# Patient Record
Sex: FEMALE | Race: OTHER | HISPANIC OR LATINO | Employment: UNEMPLOYED | URBAN - METROPOLITAN AREA
[De-identification: names, ages, dates, MRNs, and addresses within clinical notes are randomized per-mention and may not be internally consistent; named-entity substitution may affect disease eponyms.]

---

## 2022-03-21 ENCOUNTER — TELEPHONE (OUTPATIENT)
Dept: OBSTETRICS AND GYNECOLOGY | Facility: CLINIC | Age: 22
End: 2022-03-21

## 2022-04-04 ENCOUNTER — HOSPITAL ENCOUNTER (EMERGENCY)
Facility: HOSPITAL | Age: 22
Discharge: HOME | End: 2022-04-04
Attending: EMERGENCY MEDICINE
Payer: MEDICAID

## 2022-04-04 VITALS
SYSTOLIC BLOOD PRESSURE: 138 MMHG | TEMPERATURE: 98 F | BODY MASS INDEX: 22.15 KG/M2 | RESPIRATION RATE: 20 BRPM | HEART RATE: 99 BPM | WEIGHT: 125 LBS | OXYGEN SATURATION: 100 % | DIASTOLIC BLOOD PRESSURE: 82 MMHG | HEIGHT: 63 IN

## 2022-04-04 DIAGNOSIS — R31.9 URINARY TRACT INFECTION WITH HEMATURIA, SITE UNSPECIFIED: Primary | ICD-10-CM

## 2022-04-04 DIAGNOSIS — O20.0 THREATENED MISCARRIAGE IN EARLY PREGNANCY: ICD-10-CM

## 2022-04-04 DIAGNOSIS — N39.0 URINARY TRACT INFECTION WITH HEMATURIA, SITE UNSPECIFIED: Primary | ICD-10-CM

## 2022-04-04 DIAGNOSIS — E87.6 HYPOKALEMIA: ICD-10-CM

## 2022-04-04 DIAGNOSIS — R01.1 MURMUR: ICD-10-CM

## 2022-04-04 DIAGNOSIS — R01.1 SYSTOLIC MURMUR: ICD-10-CM

## 2022-04-04 LAB
ABO + RH BLD: NORMAL
ALBUMIN SERPL BCP-MCNC: 4.1 G/DL (ref 3.5–5.2)
ALP SERPL-CCNC: 41 U/L (ref 55–135)
ALT SERPL W/O P-5'-P-CCNC: 12 U/L (ref 10–44)
ANION GAP SERPL CALC-SCNC: 11 MMOL/L (ref 8–16)
AST SERPL-CCNC: 16 U/L (ref 10–40)
B-HCG UR QL: POSITIVE
BACTERIA #/AREA URNS HPF: NEGATIVE /HPF
BASOPHILS # BLD AUTO: 0.02 K/UL (ref 0–0.2)
BASOPHILS NFR BLD: 0.3 % (ref 0–1.9)
BILIRUB SERPL-MCNC: 0.4 MG/DL (ref 0.1–1)
BILIRUB UR QL STRIP: NEGATIVE
BUN SERPL-MCNC: 5 MG/DL (ref 6–20)
CALCIUM SERPL-MCNC: 9.4 MG/DL (ref 8.7–10.5)
CHLORIDE SERPL-SCNC: 100 MMOL/L (ref 95–110)
CLARITY UR: ABNORMAL
CO2 SERPL-SCNC: 24 MMOL/L (ref 23–29)
COLOR UR: YELLOW
CREAT SERPL-MCNC: 0.4 MG/DL (ref 0.5–1.4)
CTP QC/QA: YES
DIFFERENTIAL METHOD: ABNORMAL
EOSINOPHIL # BLD AUTO: 0.1 K/UL (ref 0–0.5)
EOSINOPHIL NFR BLD: 0.7 % (ref 0–8)
ERYTHROCYTE [DISTWIDTH] IN BLOOD BY AUTOMATED COUNT: 11.9 % (ref 11.5–14.5)
EST. GFR  (AFRICAN AMERICAN): >60 ML/MIN/1.73 M^2
EST. GFR  (NON AFRICAN AMERICAN): >60 ML/MIN/1.73 M^2
GLUCOSE SERPL-MCNC: 91 MG/DL (ref 70–110)
GLUCOSE UR QL STRIP: NEGATIVE
HCG INTACT+B SERPL-ACNC: NORMAL MIU/ML
HCT VFR BLD AUTO: 40.7 % (ref 37–48.5)
HGB BLD-MCNC: 14.3 G/DL (ref 12–16)
HGB UR QL STRIP: NEGATIVE
HYALINE CASTS #/AREA URNS LPF: 8 /LPF
IMM GRANULOCYTES # BLD AUTO: 0.02 K/UL (ref 0–0.04)
IMM GRANULOCYTES NFR BLD AUTO: 0.3 % (ref 0–0.5)
KETONES UR QL STRIP: NEGATIVE
LEUKOCYTE ESTERASE UR QL STRIP: ABNORMAL
LYMPHOCYTES # BLD AUTO: 1.6 K/UL (ref 1–4.8)
LYMPHOCYTES NFR BLD: 20.7 % (ref 18–48)
MCH RBC QN AUTO: 31.2 PG (ref 27–31)
MCHC RBC AUTO-ENTMCNC: 35.1 G/DL (ref 32–36)
MCV RBC AUTO: 89 FL (ref 82–98)
MICROSCOPIC COMMENT: ABNORMAL
MONOCYTES # BLD AUTO: 0.5 K/UL (ref 0.3–1)
MONOCYTES NFR BLD: 6.9 % (ref 4–15)
NEUTROPHILS # BLD AUTO: 5.3 K/UL (ref 1.8–7.7)
NEUTROPHILS NFR BLD: 71.1 % (ref 38–73)
NITRITE UR QL STRIP: NEGATIVE
NRBC BLD-RTO: 0 /100 WBC
PH UR STRIP: 8 [PH] (ref 5–8)
PLATELET # BLD AUTO: 194 K/UL (ref 150–450)
PMV BLD AUTO: 10.4 FL (ref 9.2–12.9)
POTASSIUM SERPL-SCNC: 3.3 MMOL/L (ref 3.5–5.1)
PROT SERPL-MCNC: 7.5 G/DL (ref 6–8.4)
PROT UR QL STRIP: NEGATIVE
RBC # BLD AUTO: 4.59 M/UL (ref 4–5.4)
RBC #/AREA URNS HPF: 4 /HPF (ref 0–4)
SODIUM SERPL-SCNC: 135 MMOL/L (ref 136–145)
SP GR UR STRIP: 1 (ref 1–1.03)
SPECIMEN SOURCE: NORMAL
SQUAMOUS #/AREA URNS HPF: 6 /HPF
T VAGINALIS GENITAL QL WET PREP: NORMAL
URN SPEC COLLECT METH UR: ABNORMAL
UROBILINOGEN UR STRIP-ACNC: NEGATIVE EU/DL
WBC # BLD AUTO: 7.5 K/UL (ref 3.9–12.7)
WBC #/AREA URNS HPF: 6 /HPF (ref 0–5)
YEAST GENITAL QL WET PREP: NORMAL

## 2022-04-04 PROCEDURE — 99284 EMERGENCY DEPT VISIT MOD MDM: CPT | Mod: 25

## 2022-04-04 PROCEDURE — 25000003 PHARM REV CODE 250: Performed by: NURSE PRACTITIONER

## 2022-04-04 PROCEDURE — 87210 SMEAR WET MOUNT SALINE/INK: CPT | Performed by: NURSE PRACTITIONER

## 2022-04-04 PROCEDURE — 81001 URINALYSIS AUTO W/SCOPE: CPT | Performed by: NURSE PRACTITIONER

## 2022-04-04 PROCEDURE — 85025 COMPLETE CBC W/AUTO DIFF WBC: CPT | Performed by: NURSE PRACTITIONER

## 2022-04-04 PROCEDURE — 86901 BLOOD TYPING SEROLOGIC RH(D): CPT | Performed by: NURSE PRACTITIONER

## 2022-04-04 PROCEDURE — 84702 CHORIONIC GONADOTROPIN TEST: CPT | Performed by: NURSE PRACTITIONER

## 2022-04-04 PROCEDURE — 87491 CHLMYD TRACH DNA AMP PROBE: CPT | Performed by: NURSE PRACTITIONER

## 2022-04-04 PROCEDURE — 81025 URINE PREGNANCY TEST: CPT | Performed by: NURSE PRACTITIONER

## 2022-04-04 PROCEDURE — 80053 COMPREHEN METABOLIC PANEL: CPT | Performed by: NURSE PRACTITIONER

## 2022-04-04 PROCEDURE — 87591 N.GONORRHOEAE DNA AMP PROB: CPT | Performed by: NURSE PRACTITIONER

## 2022-04-04 RX ORDER — CEFUROXIME AXETIL 250 MG/1
250 TABLET ORAL EVERY 12 HOURS
Qty: 10 TABLET | Refills: 0 | Status: SHIPPED | OUTPATIENT
Start: 2022-04-04 | End: 2022-04-09

## 2022-04-04 RX ORDER — POTASSIUM CHLORIDE 20 MEQ/1
20 TABLET, EXTENDED RELEASE ORAL ONCE
Status: COMPLETED | OUTPATIENT
Start: 2022-04-04 | End: 2022-04-04

## 2022-04-04 RX ADMIN — POTASSIUM CHLORIDE 20 MEQ: 20 TABLET, EXTENDED RELEASE ORAL at 12:04

## 2022-04-04 NOTE — ED PROVIDER NOTES
Encounter Date: 4/4/2022       History     Chief Complaint   Patient presents with    PREGNANCY PROBLEM     APPROX 11 WEEKS PREG    Vaginal Bleeding     ONSET Friday, NONE TODAY OR YESTERDAY    Abdominal Cramping     Jerri Ramirez is a 22 year old female with no pmh presenting to the ED with c/o vaginal bleeding that occurred 2-3 days ago but has not happened again since that time. She states she only noticed blood when wiping after urinating. She also has had lower abdominal cramping with no vaginal discharge, dysuria, frequency, urgency, hematuria, fever. She is approximately 11 weeks pregnant and has not been seen by OB. She has no prior pregnancy history.     The history is limited by a language barrier. A  was used.     Review of patient's allergies indicates:  No Known Allergies  History reviewed. No pertinent past medical history.  No past surgical history on file.  No family history on file.     Review of Systems   Constitutional: Negative for fever.   HENT: Negative for sore throat.    Respiratory: Negative for shortness of breath.    Cardiovascular: Negative for chest pain.   Gastrointestinal: Positive for abdominal pain. Negative for diarrhea, nausea and vomiting.   Genitourinary: Positive for vaginal bleeding. Negative for dysuria and vaginal discharge.   Musculoskeletal: Negative for back pain.   Skin: Negative for rash.   Neurological: Negative for weakness.   Hematological: Does not bruise/bleed easily.       Physical Exam     Initial Vitals [04/04/22 0825]   BP Pulse Resp Temp SpO2   (!) 139/93 100 20 98.3 °F (36.8 °C) 100 %      MAP       --         Physical Exam    Nursing note and vitals reviewed.  Constitutional: She appears well-developed and well-nourished. She is not diaphoretic. No distress.   HENT:   Head: Normocephalic and atraumatic.   Mouth/Throat: Oropharynx is clear and moist.   Eyes: Conjunctivae are normal.   Neck: Neck supple.   Cardiovascular: Normal  rate, regular rhythm and intact distal pulses. Exam reveals no gallop and no friction rub.    Murmur heard.   Systolic murmur is present with a grade of 1/6.  Pulmonary/Chest: Breath sounds normal. No respiratory distress. She has no wheezes. She has no rhonchi. She has no rales.   Abdominal: Abdomen is soft. She exhibits no distension. There is abdominal tenderness (mild suprapubic).   Genitourinary: Uterus is not tender. Cervix exhibits no motion tenderness and no discharge. Right adnexum displays no tenderness. Left adnexum displays no tenderness.    No vaginal discharge or bleeding.   No bleeding in the vagina.   Musculoskeletal:         General: Normal range of motion.      Cervical back: Neck supple.     Neurological: She is alert and oriented to person, place, and time.   Skin: No rash noted. No erythema.   Psychiatric: Her speech is normal.         ED Course   Procedures  Labs Reviewed   CBC W/ AUTO DIFFERENTIAL - Abnormal; Notable for the following components:       Result Value    MCH 31.2 (*)     All other components within normal limits    Narrative:     Release to patient->Immediate   COMPREHENSIVE METABOLIC PANEL - Abnormal; Notable for the following components:    Sodium 135 (*)     Potassium 3.3 (*)     BUN 5 (*)     Creatinine 0.4 (*)     Alkaline Phosphatase 41 (*)     All other components within normal limits    Narrative:     Release to patient->Immediate   URINALYSIS, REFLEX TO URINE CULTURE - Abnormal; Notable for the following components:    Appearance, UA Hazy (*)     Leukocytes, UA 1+ (*)     All other components within normal limits    Narrative:     Specimen Source->Urine   URINALYSIS MICROSCOPIC - Abnormal; Notable for the following components:    WBC, UA 6 (*)     Hyaline Casts, UA 8 (*)     All other components within normal limits    Narrative:     Specimen Source->Urine   POCT URINE PREGNANCY - Abnormal; Notable for the following components:    POC Preg Test, Ur Positive (*)     All  other components within normal limits   VAGINAL SCREEN   C. TRACHOMATIS/N. GONORRHOEAE BY AMP DNA   HCG, QUANTITATIVE    Narrative:     Release to patient->Immediate   GROUP & RH          Imaging Results          US OB <14 Wks, TransAbd, Single Gestation (Final result)  Result time 04/04/22 10:12:53   Procedure changed from US OB <14 Wks TransAbd & TransVag, Single Gestation (XPD)     Final result by Alban Mann MD (04/04/22 10:12:53)                 Narrative:    HISTORY: Pelvic pain,  vaginal bleeding, first trimester pregnancy.    FINDINGS: Transabdominal obstetric ultrasound was performed, with no prior studies for comparison. The uterus is anteverted, and contains a single intrauterine gestational sac with mean sac diameter of 5.35 cm. The gestational sac contains a single live fetus with crown-rump length of 3.04 cm, and fetal heart rate of 167 bpm. There are no abnormal perigestational fluid collections.    Both ovaries are normal in size and echotexture, and have normal color and spectral Doppler vascular flow. The right ovary measures 4.3 x 2.3 x 1.9 cm, with the left ovary measuring 3 x 1.9 x 1.6 cm. There are no adnexal masses or pelvic free fluid demonstrated.    IMPRESSION: Single live intrauterine pregnancy with estimated gestational age of 10 weeks 5 days, and estimated date of delivery of 10/26/2022.    Electronically signed by:  Alban Mann MD  4/4/2022 10:12 AM CDT Workstation: [id number]                               Medications   potassium chloride SA CR tablet 20 mEq (20 mEq Oral Given 4/4/22 1209)           APC / Resident Notes:   The patient has a confirmed IUP on ultrasound with no vaginal bleeding or cervical discharge on pelvic exam. She has no CMT. Fetus measuring 10 weeks 5 days which is consistent with patient's dates of LMP. Discussed possibility of threatened miscarriage with the patient using  service and advised her to refrain from intercourse or strenuous activity  until cleared by OB. Instructed to return in 48-72 hours for repeat HCG if she is unable to be seen by GYN during that time.   She does have some blood, WBC, and leukocytes in the urine and likely has a UTI. No vaginal bleeding noted on exam and she states she only noticed blood with wiping after urinating. Will treat with ceftin and send culture.  Mildly hypokalemic and this was replaced orally in the ED.   She is also found to have a systolic murmur with no prior known history. EKG unremarkable and she denies any chest pain/SOB. Will refer to cardiology for further evaluation. Advised patient of importance of close follow up with cardiology and OBGYN.  Strict ED return precautions discussed and she verbalized understanding. Based on my clinical evaluation, I do not appreciate any immediate, emergent, or life threatening condition or etiology that warrants additional workup today and feel that the patient can be discharged with close follow up care.          Attending Attestation:             Attending ED Notes:   I was available for consult               Clinical Impression:   Final diagnoses:  [R01.1] Murmur  [N39.0, R31.9] Urinary tract infection with hematuria, site unspecified (Primary)  [O20.0] Threatened miscarriage in early pregnancy  [R01.1] Systolic murmur  [E87.6] Hypokalemia          ED Disposition Condition    Discharge Stable        ED Prescriptions     Medication Sig Dispense Start Date End Date Auth. Provider    cefUROXime (CEFTIN) 250 MG tablet Take 1 tablet (250 mg total) by mouth every 12 (twelve) hours. for 5 days 10 tablet 4/4/2022 4/9/2022 Dariela Che NP        Follow-up Information     Follow up With Specialties Details Why Contact Info Additional Information    Count includes the [facility] - Emergency Dept Emergency Medicine  As needed, If symptoms worsen [address] [facility] [id number] [phone] 1st floor    Irene Roque MD Obstetrics, Obstetrics and Gynecology  Schedule an appointment as soon as possible for a visit in 2 days  [address] [facility] [facility]  [address]  [facility] OBSTETRICS & GYNECOLOGY  [facility] [address]  [phone]              Zia Armenta MD  04/05/22 1733       Dariela Che NP  04/05/22 1757

## 2022-04-04 NOTE — ED NOTES
LOC: The patient is awake, alert and aware of environment with an appropriate affect, the patient is oriented x 3 and speaking appropriately.  APPEARANCE: Patient resting comfortably and in no acute distress, patient is clean and well groomed, patient's clothing properly fastened.  SKIN: The skin is warm and dry, color consistent with ethnicity, patient has normal skin turgor and moist mucus membranes, skin intact, no breakdown or brusing noted.  MUSKULOSKELETAL: Patient moving all extremities well, no obvious swelling or deformities noted.  RESPIRATORY: Airway is open and patent, respirations are spontaneous, patient has a normal effort and rate, no accessory muscle use noted.  CARDIAC: Patient has a normal rate and rhythm, no peripheral edema noted, capillary refill < 3 seconds.  ABDOMEN: Soft and non tender to palpation, no distention noted.  NEUROLOGIC: PERRL, 3mm bilaterally, eyes open spontaneously, behavior appropriate to situation, follows commands, facial expression symmetrical, bilateral hand grasp equal and even, purposeful motor response noted, normal sensation in all extremities when touched with a finger.    Patient reports vaginal bleeding 2 days ago, none in last 2 days, reports being approximately 11 weeks pregnant, denies being under care of OB at this time.

## 2022-04-06 LAB
CHLAMYDIA, AMPLIFIED DNA: NEGATIVE
N GONORRHOEAE, AMPLIFIED DNA: NEGATIVE

## 2022-04-07 ENCOUNTER — HOSPITAL ENCOUNTER (EMERGENCY)
Facility: HOSPITAL | Age: 22
Discharge: HOME | End: 2022-04-07
Attending: EMERGENCY MEDICINE
Payer: MEDICAID

## 2022-04-07 VITALS
RESPIRATION RATE: 18 BRPM | HEART RATE: 98 BPM | BODY MASS INDEX: 18.95 KG/M2 | WEIGHT: 107 LBS | TEMPERATURE: 99 F | SYSTOLIC BLOOD PRESSURE: 121 MMHG | OXYGEN SATURATION: 100 % | DIASTOLIC BLOOD PRESSURE: 84 MMHG

## 2022-04-07 DIAGNOSIS — Z34.90 INTRAUTERINE PREGNANCY: Primary | ICD-10-CM

## 2022-04-07 LAB
HBV SURFACE AG SERPL QL IA: NEGATIVE
HCG INTACT+B SERPL-ACNC: NORMAL MIU/ML
HIV 1+2 AB+HIV1 P24 AG SERPL QL IA: NEGATIVE
RPR: NONREACTIVE
RUBELLA IMMUNE STATUS: NORMAL

## 2022-04-07 PROCEDURE — 99283 EMERGENCY DEPT VISIT LOW MDM: CPT

## 2022-04-07 PROCEDURE — 84702 CHORIONIC GONADOTROPIN TEST: CPT | Performed by: EMERGENCY MEDICINE

## 2022-04-07 NOTE — ED PROVIDER NOTES
Encounter Date: 4/7/2022       History   No chief complaint on file.    Patient presents complaining of needing blood test.  Patient previously was seen in our ER a few days ago had an ultrasound which showed an IUP at 10 weeks.  She was advised repeat beta testing as an outpatient but came to our ER.  Patient has language barrier.  History is obtained through .  She denies any vaginal bleeding.  She denies any pain.        Review of patient's allergies indicates:  No Known Allergies  No past medical history on file.  No past surgical history on file.  No family history on file.     Review of Systems   All other systems reviewed and are negative.      Physical Exam     Initial Vitals [04/07/22 1121]   BP Pulse Resp Temp SpO2   (!) 123/91 107 18 98.6 °F (37 °C) 100 %      MAP       --         Physical Exam    Nursing note and vitals reviewed.  Constitutional: She appears well-developed and well-nourished.   Pleasant, polite   HENT:   Head: Normocephalic and atraumatic.   Eyes: EOM are normal.   Neck: Neck supple.   Normal range of motion.  Cardiovascular: Normal rate, regular rhythm, normal heart sounds and intact distal pulses.   Pulmonary/Chest: Breath sounds normal. No respiratory distress.   Abdominal: Abdomen is soft.   Musculoskeletal:         General: Normal range of motion.      Cervical back: Normal range of motion and neck supple.     Neurological: She is alert and oriented to person, place, and time.   Skin: Skin is warm and dry. Capillary refill takes less than 2 seconds.   Psychiatric: She has a normal mood and affect. Her behavior is normal. Judgment and thought content normal.         ED Course   Procedures  Labs Reviewed   HCG, QUANTITATIVE    Narrative:     Release to patient->Immediate          Imaging Results    None          Medications - No data to display  Medical Decision Making:   Initial Assessment:   No apparent distress  ED Management:  Patient having no vaginal bleeding  today and beta hCG is appropriate.  She will follow-up with Dr. Vega on the 22nd.                      Clinical Impression:   Final diagnoses:  [Z34.90] Intrauterine pregnancy (Primary)          ED Disposition Condition    Discharge Stable        ED Prescriptions     None        Follow-up Information     Follow up With Specialties Details Why Contact Info    Rose Vega MD Obstetrics, Obstetrics and Gynecology On 4/22/2022  [address] [facility]  [address]  [facility] OBSTETRICS & GYNECOLOGY  [facility] [address]  [phone]             Eduardo Evans MD  04/07/22 1995

## 2022-09-02 ENCOUNTER — HOSPITAL ENCOUNTER (OUTPATIENT)
Facility: HOSPITAL | Age: 22
Discharge: HOME | End: 2022-09-02
Attending: OBSTETRICS & GYNECOLOGY | Admitting: OBSTETRICS & GYNECOLOGY
Payer: MEDICAID

## 2022-09-02 VITALS — RESPIRATION RATE: 16 BRPM | HEART RATE: 93 BPM | SYSTOLIC BLOOD PRESSURE: 117 MMHG | DIASTOLIC BLOOD PRESSURE: 76 MMHG

## 2022-09-02 DIAGNOSIS — O36.5990 IUGR (INTRAUTERINE GROWTH RESTRICTION) AFFECTING CARE OF MOTHER: ICD-10-CM

## 2022-09-02 PROCEDURE — 59025 FETAL NON-STRESS TEST: CPT

## 2022-09-06 ENCOUNTER — HOSPITAL ENCOUNTER (OUTPATIENT)
Facility: HOSPITAL | Age: 22
Discharge: HOME | End: 2022-09-06
Attending: OBSTETRICS & GYNECOLOGY | Admitting: OBSTETRICS & GYNECOLOGY
Payer: MEDICAID

## 2022-09-06 VITALS — RESPIRATION RATE: 18 BRPM | SYSTOLIC BLOOD PRESSURE: 114 MMHG | HEART RATE: 90 BPM | DIASTOLIC BLOOD PRESSURE: 66 MMHG

## 2022-09-06 DIAGNOSIS — O36.5990 IUGR (INTRAUTERINE GROWTH RESTRICTION) AFFECTING CARE OF MOTHER: ICD-10-CM

## 2022-09-06 PROCEDURE — 59025 FETAL NON-STRESS TEST: CPT

## 2022-09-06 NOTE — DISCHARGE INSTRUCTIONS
Call your Doctor if you have any of the following:  Temperature above 100 degrees  Nausea, vomiting and/or diarrhea  Severe headache, dizziness, or blurred vision  Notable increase in swelling of hands or feet  Notable swelling of face and lips  Difficulty, pain or burning with urination  Foul smelling vaginal discharge  Decreased fetal movement    Llame a contreras medico si usted tiene cualquiera de los siguientes:  -Temperatura superior a 100 grados  -nauseas, vomitos o diarrhea  -cefalea, mareos o vision borrosa  -notable aumento en la hinchazon de kiah y pies  -dificultad, dolor o ardor al orinar  -flujo vaginal olor fetido  -disminucion del movimiento fetal        Come to the hospital if you have any of the following symptoms:  Your water breaks  More than 4-6 contractions in 1 hour for 2 or more hours  Vaginal bleeding like a period    Venir al hospital si usted tiene cualquiera de los siguientes sintomas:   -las pausas de agua  -contracciones cada 3-5 minutos duracion de 60 segundos por 2 o mas horas  --vaginal sangrado edinson un period    After 28 weeks, you should feel 10 distinct fetal movements within a 2 hour period.  Despues de 28 semanas, deberia sentir 10 movimientos fetales distintos dentro de un periodo de 2 horas    It is recommended that you drink 1/2 a gallon of water each day.  Tea, Soda and Juice are  in addition to this.  Beber al menos 1/2 galon de agua por lashonda. Otras bebidas son ademas del agua

## 2022-09-09 ENCOUNTER — HOSPITAL ENCOUNTER (OUTPATIENT)
Facility: HOSPITAL | Age: 22
Discharge: HOME | End: 2022-09-09
Attending: OBSTETRICS & GYNECOLOGY | Admitting: OBSTETRICS & GYNECOLOGY
Payer: MEDICAID

## 2022-09-09 VITALS — RESPIRATION RATE: 16 BRPM | HEART RATE: 87 BPM | SYSTOLIC BLOOD PRESSURE: 109 MMHG | DIASTOLIC BLOOD PRESSURE: 72 MMHG

## 2022-09-09 DIAGNOSIS — O36.5990 INTRAUTERINE GROWTH RESTRICTION (IUGR) AFFECTING CARE OF MOTHER: ICD-10-CM

## 2022-09-09 PROCEDURE — 59025 FETAL NON-STRESS TEST: CPT

## 2022-09-13 ENCOUNTER — HOSPITAL ENCOUNTER (OUTPATIENT)
Facility: HOSPITAL | Age: 22
Discharge: HOME | End: 2022-09-13
Attending: OBSTETRICS & GYNECOLOGY | Admitting: OBSTETRICS & GYNECOLOGY
Payer: MEDICAID

## 2022-09-13 VITALS — SYSTOLIC BLOOD PRESSURE: 110 MMHG | RESPIRATION RATE: 16 BRPM | DIASTOLIC BLOOD PRESSURE: 74 MMHG | HEART RATE: 90 BPM

## 2022-09-13 DIAGNOSIS — O36.5990 INTRAUTERINE GROWTH RESTRICTION (IUGR) AFFECTING CARE OF MOTHER: ICD-10-CM

## 2022-09-13 PROCEDURE — 59025 FETAL NON-STRESS TEST: CPT

## 2022-09-16 ENCOUNTER — HOSPITAL ENCOUNTER (OUTPATIENT)
Facility: HOSPITAL | Age: 22
Discharge: HOME | End: 2022-09-16
Attending: OBSTETRICS & GYNECOLOGY | Admitting: OBSTETRICS & GYNECOLOGY
Payer: MEDICAID

## 2022-09-16 VITALS — DIASTOLIC BLOOD PRESSURE: 76 MMHG | HEART RATE: 82 BPM | SYSTOLIC BLOOD PRESSURE: 122 MMHG

## 2022-09-16 DIAGNOSIS — O36.5990 IUGR, ANTENATAL: ICD-10-CM

## 2022-09-16 PROCEDURE — 59025 FETAL NON-STRESS TEST: CPT

## 2022-09-16 NOTE — DISCHARGE INSTRUCTIONS
Call your Doctor if you have any of the following:  Temperature above 100 degrees  Nausea, vomiting and/or diarrhea  Severe headache, dizziness, or blurred vision  Notable increase in swelling of hands or feet  Notable swelling of face and lips  Difficulty, pain or burning with urination  Foul smelling vaginal discharge  Decreased fetal movement    Llame a contreras medico si usted tiene cualquiera de los siguientes:  -Temperatura superior a 100 grados  -nauseas, vomitos o diarrhea  -cefalea, mareos o vision borrosa  -notable aumento en la hinchazon de kiah y pies  -dificultad, dolor o ardor al orinar  -flujo vaginal olor fetido  -disminucion del movimiento fetal        Come to the hospital if you have any of the following symptoms:  Your water breaks  More than 4-6 contractions in 1 hour for 2 or more hours  Vaginal bleeding like a period    Venir al hospital si usted tiene cualquiera de los siguientes sintomas:   -las pausas de agua  -contracciones cada 3-5 minutos duracion de 60 segundos por 2 o mas horas  --vaginal sangrado edinson un period    After 28 weeks, you should feel 10 distinct fetal movements within a 2 hour period.  Despues de 28 semanas, deberia sentir 10 movimientos fetales distintos dentro de un periodo de 2 horas    It is recommended that you drink 1/2 a gallon of water each day.  Tea, Soda and Juice are  in addition to this.  Beber al menos 1/2 galon de agua por lashonda. Otras bebidas son ademas del agua     Keep your scheduled appointment with your provider.    Call your Doctor if you have any of the following:  Temperature above 100 degrees  Nausea, vomiting and/or diarrhea  Severe headache, dizziness, or blurred vision  Notable increase in swelling of hands or feet  Notable swelling of face and lips  Difficulty, pain or burning with urination  Foul smelling vaginal discharge  Decreased fetal movement    Come to the hospital if you have any of the following symptoms:  Your water breaks  More than 4-6  contractions in 1 hour for 2 or more hours  Vaginal bleeding like a period    After 28 weeks, you should feel 10 distinct fetal movements within a 2 hour period.    It is recommended that you drink 1/2 a gallon of water each day.  Tea, Soda and Juice are  in addition to this.

## 2022-09-20 ENCOUNTER — HOSPITAL ENCOUNTER (OUTPATIENT)
Facility: HOSPITAL | Age: 22
Discharge: HOME | End: 2022-09-20
Attending: OBSTETRICS & GYNECOLOGY | Admitting: OBSTETRICS & GYNECOLOGY
Payer: MEDICAID

## 2022-09-20 VITALS — DIASTOLIC BLOOD PRESSURE: 71 MMHG | HEART RATE: 84 BPM | RESPIRATION RATE: 17 BRPM | SYSTOLIC BLOOD PRESSURE: 111 MMHG

## 2022-09-20 DIAGNOSIS — O36.5990 IUGR (INTRAUTERINE GROWTH RESTRICTION) AFFECTING CARE OF MOTHER: ICD-10-CM

## 2022-09-20 PROCEDURE — 59025 FETAL NON-STRESS TEST: CPT

## 2022-09-23 ENCOUNTER — HOSPITAL ENCOUNTER (OUTPATIENT)
Facility: HOSPITAL | Age: 22
Discharge: HOME | End: 2022-09-23
Attending: OBSTETRICS & GYNECOLOGY | Admitting: OBSTETRICS & GYNECOLOGY
Payer: MEDICAID

## 2022-09-23 DIAGNOSIS — O36.5990 INTRAUTERINE GROWTH RESTRICTION (IUGR) AFFECTING CARE OF MOTHER: ICD-10-CM

## 2022-09-23 PROCEDURE — 59025 FETAL NON-STRESS TEST: CPT

## 2022-09-23 NOTE — PROGRESS NOTES
21 y/o at 35.5 here for NST for IUGR.   09/23/22 1455   Nonstress Test   Variability 6-25 BPM   Decelerations None   Accelerations Yes   Acoustic Stimulator No   Baseline 130 BPM   Uterine Irritability No   Contractions Not present   Interpretation   Nonstress Test Interpretation Reactive   Overall Impression Reassuring   Comments Strip reviewed by Dr. Vega. Ok to discharge home.

## 2022-09-27 ENCOUNTER — HOSPITAL ENCOUNTER (OUTPATIENT)
Facility: HOSPITAL | Age: 22
Discharge: HOME | End: 2022-09-27
Attending: OBSTETRICS & GYNECOLOGY | Admitting: OBSTETRICS & GYNECOLOGY
Payer: MEDICAID

## 2022-09-27 VITALS — SYSTOLIC BLOOD PRESSURE: 113 MMHG | RESPIRATION RATE: 18 BRPM | DIASTOLIC BLOOD PRESSURE: 78 MMHG | HEART RATE: 108 BPM

## 2022-09-27 DIAGNOSIS — O36.5990 IUGR (INTRAUTERINE GROWTH RESTRICTION) AFFECTING CARE OF MOTHER: ICD-10-CM

## 2022-09-27 PROCEDURE — 59025 FETAL NON-STRESS TEST: CPT

## 2022-09-29 LAB — PRENATAL STREP B CULTURE: NEGATIVE

## 2022-09-30 ENCOUNTER — HOSPITAL ENCOUNTER (OUTPATIENT)
Facility: HOSPITAL | Age: 22
Discharge: HOME | End: 2022-09-30
Attending: OBSTETRICS & GYNECOLOGY | Admitting: OBSTETRICS & GYNECOLOGY
Payer: MEDICAID

## 2022-09-30 DIAGNOSIS — O36.5990 INTRAUTERINE GROWTH RESTRICTION (IUGR) AFFECTING CARE OF MOTHER: ICD-10-CM

## 2022-09-30 PROCEDURE — 59025 FETAL NON-STRESS TEST: CPT

## 2022-09-30 NOTE — PROGRESS NOTES
23 y/o G1 @36.5 here for NST for IUGR   09/30/22 1529   Nonstress Test   Variability 6-25 BPM   Decelerations None   Accelerations Yes   Acoustic Stimulator No   Baseline 140 BPM   Uterine Irritability No   Contractions Not present   Interpretation   Comments Strip reviewed by Dr. Vega   Ok to discharge home.

## 2022-10-03 ENCOUNTER — HOSPITAL ENCOUNTER (INPATIENT)
Facility: HOSPITAL | Age: 22
LOS: 3 days | Discharge: HOME | End: 2022-10-06
Attending: OBSTETRICS & GYNECOLOGY | Admitting: OBSTETRICS & GYNECOLOGY
Payer: MEDICAID

## 2022-10-03 DIAGNOSIS — O36.5990 IUGR, ANTENATAL: ICD-10-CM

## 2022-10-03 LAB
ABO + RH BLD: NORMAL
AMPHET+METHAMPHET UR QL: NEGATIVE
BARBITURATES UR QL SCN>200 NG/ML: NEGATIVE
BASOPHILS # BLD AUTO: 0.03 K/UL (ref 0–0.2)
BASOPHILS NFR BLD: 0.3 % (ref 0–1.9)
BENZODIAZ UR QL SCN>200 NG/ML: NEGATIVE
BILIRUB UR QL STRIP: NEGATIVE
BLD GP AB SCN CELLS X3 SERPL QL: NORMAL
BZE UR QL SCN: NEGATIVE
CANNABINOIDS UR QL SCN: NEGATIVE
CLARITY UR: CLEAR
COLOR UR: COLORLESS
CREAT UR-MCNC: 20 MG/DL (ref 15–325)
DIFFERENTIAL METHOD: ABNORMAL
EOSINOPHIL # BLD AUTO: 0.1 K/UL (ref 0–0.5)
EOSINOPHIL NFR BLD: 0.7 % (ref 0–8)
ERYTHROCYTE [DISTWIDTH] IN BLOOD BY AUTOMATED COUNT: 13.5 % (ref 11.5–14.5)
GLUCOSE UR QL STRIP: NEGATIVE
HCT VFR BLD AUTO: 33.2 % (ref 37–48.5)
HGB BLD-MCNC: 10.6 G/DL (ref 12–16)
HGB UR QL STRIP: NEGATIVE
IMM GRANULOCYTES # BLD AUTO: 0.18 K/UL (ref 0–0.04)
IMM GRANULOCYTES NFR BLD AUTO: 1.7 % (ref 0–0.5)
KETONES UR QL STRIP: NEGATIVE
LEUKOCYTE ESTERASE UR QL STRIP: NEGATIVE
LYMPHOCYTES # BLD AUTO: 1.7 K/UL (ref 1–4.8)
LYMPHOCYTES NFR BLD: 15.8 % (ref 18–48)
MCH RBC QN AUTO: 26.8 PG (ref 27–31)
MCHC RBC AUTO-ENTMCNC: 31.9 G/DL (ref 32–36)
MCV RBC AUTO: 84 FL (ref 82–98)
MONOCYTES # BLD AUTO: 0.8 K/UL (ref 0.3–1)
MONOCYTES NFR BLD: 7.4 % (ref 4–15)
NEUTROPHILS # BLD AUTO: 7.9 K/UL (ref 1.8–7.7)
NEUTROPHILS NFR BLD: 74.1 % (ref 38–73)
NITRITE UR QL STRIP: NEGATIVE
NRBC BLD-RTO: 0 /100 WBC
OPIATES UR QL SCN: NEGATIVE
PCP UR QL SCN>25 NG/ML: NEGATIVE
PH UR STRIP: 8 [PH] (ref 5–8)
PLATELET # BLD AUTO: 185 K/UL (ref 150–450)
PMV BLD AUTO: 11.3 FL (ref 9.2–12.9)
PROT UR QL STRIP: NEGATIVE
RBC # BLD AUTO: 3.95 M/UL (ref 4–5.4)
SARS-COV-2 RDRP RESP QL NAA+PROBE: NEGATIVE
SP GR UR STRIP: 1 (ref 1–1.03)
TOXICOLOGY INFORMATION: NORMAL
URN SPEC COLLECT METH UR: ABNORMAL
UROBILINOGEN UR STRIP-ACNC: NEGATIVE EU/DL
WBC # BLD AUTO: 10.59 K/UL (ref 3.9–12.7)

## 2022-10-03 PROCEDURE — 85025 COMPLETE CBC W/AUTO DIFF WBC: CPT | Performed by: OBSTETRICS & GYNECOLOGY

## 2022-10-03 PROCEDURE — U0002 COVID-19 LAB TEST NON-CDC: HCPCS | Performed by: OBSTETRICS & GYNECOLOGY

## 2022-10-03 PROCEDURE — 86592 SYPHILIS TEST NON-TREP QUAL: CPT | Performed by: OBSTETRICS & GYNECOLOGY

## 2022-10-03 PROCEDURE — 81003 URINALYSIS AUTO W/O SCOPE: CPT | Performed by: OBSTETRICS & GYNECOLOGY

## 2022-10-03 PROCEDURE — 80307 DRUG TEST PRSMV CHEM ANLYZR: CPT | Performed by: OBSTETRICS & GYNECOLOGY

## 2022-10-03 PROCEDURE — 86901 BLOOD TYPING SEROLOGIC RH(D): CPT | Performed by: OBSTETRICS & GYNECOLOGY

## 2022-10-03 PROCEDURE — 30000890 LABCORP MISCELLANEOUS TEST: Performed by: OBSTETRICS & GYNECOLOGY

## 2022-10-03 PROCEDURE — 25000003 PHARM REV CODE 250: Performed by: OBSTETRICS & GYNECOLOGY

## 2022-10-03 PROCEDURE — 36415 COLL VENOUS BLD VENIPUNCTURE: CPT | Performed by: OBSTETRICS & GYNECOLOGY

## 2022-10-03 PROCEDURE — 12000002 HC ACUTE/MED SURGE SEMI-PRIVATE ROOM

## 2022-10-03 RX ORDER — ONDANSETRON 2 MG/ML
4 INJECTION INTRAMUSCULAR; INTRAVENOUS EVERY 6 HOURS PRN
Status: DISCONTINUED | OUTPATIENT
Start: 2022-10-03 | End: 2022-10-06 | Stop reason: HOSPADM

## 2022-10-03 RX ORDER — BUTORPHANOL TARTRATE 1 MG/ML
1 INJECTION INTRAMUSCULAR; INTRAVENOUS
Status: DISCONTINUED | OUTPATIENT
Start: 2022-10-03 | End: 2022-10-05

## 2022-10-03 RX ORDER — SODIUM CHLORIDE, SODIUM LACTATE, POTASSIUM CHLORIDE, CALCIUM CHLORIDE 600; 310; 30; 20 MG/100ML; MG/100ML; MG/100ML; MG/100ML
INJECTION, SOLUTION INTRAVENOUS CONTINUOUS
Status: DISCONTINUED | OUTPATIENT
Start: 2022-10-03 | End: 2022-10-05

## 2022-10-03 RX ORDER — CALCIUM CARBONATE 200(500)MG
500 TABLET,CHEWABLE ORAL 3 TIMES DAILY PRN
Status: DISCONTINUED | OUTPATIENT
Start: 2022-10-03 | End: 2022-10-06 | Stop reason: HOSPADM

## 2022-10-03 RX ADMIN — DINOPROSTONE 10 MG: 10 INSERT VAGINAL at 06:10

## 2022-10-03 NOTE — NURSING
[facility]  Department of Obstetrics and Gynecology  Labor & Delivery Triage Assessment    PATIENT NAME: Jerri Ramirez  MRN: [id number]  TODAY'S DATE: 10/03/2022    CHIEF COMPLAINT: Contractions      OB History    Para Term  AB Living   1 0 0 0 0 0   SAB IAB Ectopic Multiple Live Births   0 0 0 0 0      # Outcome Date GA Lbr Johan/2nd Weight Sex Delivery Anes PTL Lv   1 Current              History reviewed. No pertinent past medical history.  History reviewed. No pertinent surgical history.      VITAL SIGNS - ABNORMAL VITALS INCLUDE TEMP >100.4,RR <12 or >26, SUSTAINED MATERNAL PULSE <60 or >120     VITAL SIGNS (Most Recent)  Temp: 98.3 °F (36.8 °C) (10/03/22 1412)  Pulse: 89 (10/03/22 1412)  Resp: 16 (10/03/22 1412)  BP: 123/80 (10/03/22 1412)  SpO2: 99 % (10/03/22 1412)    VITAL SIGNS     normal  HEADACHE    no     VOMITING    no  VISUAL DISTURBANCES  no  EPIGASTRIC PAIN        no  PROTEINURIA 2+ or MORE             no   EDEMA FACE/EXTREMITIES            no    FETAL MOVEMENT     FETAL MOVEMENT: Active  FETAL HEART RATE BASELINE =  135  normal  FETAL HEART RATE VARIABILITY:  Moderate  FETAL HEART RATE ACCELERATIONS FOR GESTATIONAL AGE: present  FETAL HEART RATE DECELERATIONS: none    ABDOMINAL PAIN/CRAMPING/CONTRACTIONS     Patient is complaining of abdominal pain/cramping/contractions. Contraction pattern is Irregular, less than 5 minutes apart. Contraction strength is mild. Resting tone is relaxed. Abdominal palpation is non-tender.    RUPTURE OF MEMBRANES OR LEAKING OF AMNIOTIC FLUID     Patient denies ROM or leaking of amniotic fluid.    VAGINAL BLEEDING     Patient denies vaginal bleeding.    VAGINAL EXAM     DILATION:  0.5  STATION:  -3  EFFACEMENT:  20  PRESENTATION:  vertex      VAGINAL EXAM DEFERRED DUE TO:   na    PAIN PRESENT ON ARRIVAL     ONSET:   This am  LOCATION:  Back and abdomen  PAIN SCALE (0-10):  6    DESCRIPTION: cramping    EXACERBATION OF CHRONIC CONDITION  (i.e. DM, ASTHMA, HTN)     na    PATIENT DISPOSITION     Admitted to Labor & Delivery      Dr. Vega notified at 1427 of the above assessment.    Gema Cavazos RN  [facility]  10/03/2022

## 2022-10-04 LAB — RPR SER QL: NORMAL

## 2022-10-04 PROCEDURE — 25000003 PHARM REV CODE 250: Performed by: OBSTETRICS & GYNECOLOGY

## 2022-10-04 PROCEDURE — 72200005 HC VAGINAL DELIVERY LEVEL II

## 2022-10-04 PROCEDURE — 36415 COLL VENOUS BLD VENIPUNCTURE: CPT | Performed by: OBSTETRICS & GYNECOLOGY

## 2022-10-04 PROCEDURE — 63600175 PHARM REV CODE 636 W HCPCS: Performed by: OBSTETRICS & GYNECOLOGY

## 2022-10-04 PROCEDURE — 12000002 HC ACUTE/MED SURGE SEMI-PRIVATE ROOM

## 2022-10-04 RX ORDER — PROCHLORPERAZINE EDISYLATE 5 MG/ML
5 INJECTION INTRAMUSCULAR; INTRAVENOUS EVERY 6 HOURS PRN
Status: DISCONTINUED | OUTPATIENT
Start: 2022-10-04 | End: 2022-10-06 | Stop reason: HOSPADM

## 2022-10-04 RX ORDER — ONDANSETRON 4 MG/1
8 TABLET, ORALLY DISINTEGRATING ORAL EVERY 8 HOURS PRN
Status: DISCONTINUED | OUTPATIENT
Start: 2022-10-04 | End: 2022-10-06 | Stop reason: HOSPADM

## 2022-10-04 RX ORDER — OXYTOCIN-SODIUM CHLORIDE 0.9% IV SOLN 30 UNIT/500ML 30-0.9/5 UT/ML-%
0-30 SOLUTION INTRAVENOUS CONTINUOUS
Status: DISCONTINUED | OUTPATIENT
Start: 2022-10-04 | End: 2022-10-05

## 2022-10-04 RX ORDER — SODIUM CHLORIDE 0.9 % (FLUSH) 0.9 %
10 SYRINGE (ML) INJECTION
Status: DISCONTINUED | OUTPATIENT
Start: 2022-10-04 | End: 2022-10-06 | Stop reason: HOSPADM

## 2022-10-04 RX ADMIN — BUTORPHANOL TARTRATE 1 MG: 1 INJECTION, SOLUTION INTRAMUSCULAR; INTRAVENOUS at 07:10

## 2022-10-04 RX ADMIN — Medication 2 MILLI-UNITS/MIN: at 07:10

## 2022-10-04 RX ADMIN — PROMETHAZINE HYDROCHLORIDE 12.5 MG: 25 INJECTION INTRAMUSCULAR; INTRAVENOUS at 07:10

## 2022-10-04 RX ADMIN — BENZOCAINE AND LEVOMENTHOL: 200; 5 SPRAY TOPICAL at 03:10

## 2022-10-04 RX ADMIN — SODIUM CHLORIDE, SODIUM LACTATE, POTASSIUM CHLORIDE, AND CALCIUM CHLORIDE: .6; .31; .03; .02 INJECTION, SOLUTION INTRAVENOUS at 07:10

## 2022-10-04 NOTE — PROGRESS NOTES
[facility]  Obstetrics  Labor Progress Note    Patient Name: Jerri Ramirez  MRN: [id number]  Admission Date: 10/3/2022  Hospital Length of Stay: 1 days  Attending Physician: Rose Vega MD  Primary Care Provider: Adeline Vincent MD    Subjective:     Principal Problem:IUGR,     Hospital Course:  No notes on file    Interval History:  Jerri is a 22 y.o.  at 37w2d. She is doing well. She is comfortable s/p stadol and phenergan this morning.  Pt declines epidural    Objective:     Vital Signs (Most Recent):  Temp: 99.9 °F (37.7 °C) (10/04/22 0615)  Pulse: 90 (10/04/22 0615)  Resp: 18 (10/04/22 0544)  BP: 118/62 (10/04/22 0615)  SpO2: 99 % (10/03/22 1412) Vital Signs (24h Range):  Temp:  [98.3 °F (36.8 °C)-99.9 °F (37.7 °C)] 99.9 °F (37.7 °C)  Pulse:  [69-96] 90  Resp:  [16-18] 18  SpO2:  [99 %] 99 %  BP: (103-153)/(57-80) 118/62     Weight: 61.7 kg (136 lb)  Body mass index is 24.09 kg/m².    FHT: Cat 1 (reassuring)  TOCO:  Q irregular minutes    Cervical Exam:  Dilation:  2  Effacement:  60  Station: -1  Presentation: Vertex     Significant Labs:  Lab Results   Component Value Date    GROUPTRH O POS 10/03/2022    HEPBSAG Negative 2022    STREPBCULT negative 2022       I have personallly reviewed all pertinent lab results from the last 24 hours.    Physical Exam    Assessment/Plan:     22 y.o. female  at 37w2d for:    * IUGR,   IUP at 37.2  IUG  IOL  S/p cervidil  Good progress    AROM - clear  Continue pitocin          Rose Vega MD  Obstetrics  [facility]

## 2022-10-04 NOTE — NURSING TRANSFER
Nursing Transfer Note             Reason patient is being transferred: postpartum     Transfer To: 2115     Transfer via wheelchair     Transfer with all personal belongings and family     Transported by JANY Cavazos, rn  Medicines sent:na  Any special needs or follow-up needed: none  Chart send with patient: Yes     Notified: pts family accompanying pt     Patient reassessed at: 1420     Upon arrival to floor: patient oriented to room, call bell in reach and bed in lowest position          1 day

## 2022-10-04 NOTE — L&D DELIVERY NOTE
[facility]  Vaginal Delivery   Operative Note    SUMMARY     Normal spontaneous vaginal delivery of live infant, Nurse called by FOB because the baby was delivering into the bed.  Shoulders and body followed easily.   Infant placed on patient's abdomen with nursery nurse present.  Cord clamped and cut.  Placenta S/S/I. A 6-7 cm clot delivered immediately after the placenta. Patient was given IV pitocin.  No lacerations.   Uterus firm below umbilicus. Pt given methergine 0.2 mg IM for small amount of active bleeding without atony. Sponge, lap, needle counts correct.  The patient tolerated well.      NO epidural  Apgars 8/9   ml     Indications: IUGR,   Pregnancy complicated by:   Patient Active Problem List   Diagnosis    IUGR,      Admitting GA: 37w2d    Delivery Information for Girl Jerri Ramirez    Birth information:  YOB: 2023   Time of birth: 12:10 PM   Sex: female   Head Delivery Date/Time:     Delivery type:    Gestational Age: 37w2d    Delivery Providers    Delivering clinician:            Measurements    Weight:   Length:          Apgars    Living status: Living  Apgars:  1 min.:  5 min.:  10 min.:  15 min.:  20 min.:    Skin color:         Heart rate:         Reflex irritability:         Muscle tone:         Respiratory effort:         Total:                                  Interventions/Resuscitation           Cord    Vessels: 3 vessels  Complications: Nuchal  Nuchal Intervention: reduced  Nuchal Cord Description: loose nuchal cord  Number of Loops: 1  Delayed Cord Clamping?: No  Cord Clamped Date/Time: 10/4/2022 12:26 PM       Placenta    Placenta delivery date/time: 10/4/2022 1224  Placenta removal: Spontaneous  Placenta appearance: Intact  Placenta disposition: pathology           Labor Events:       labor:       Labor Onset Date/Time:         Dilation Complete Date/Time:         Start Pushing Date/Time:         Start Pushing  Date/Time:       Rupture Date/Time: 10/04/22  0748         Rupture type: ARM (Artificial Rupture)           Fluid Amount:         Fluid Color:                  steroids:       Antibiotics given for GBS:       Induction:       Indications for induction:        Augmentation:       Indications for augmentation:       Labor complications:       Additional complications:          Cervical ripening:                     Delivery:      Episiotomy:       Indication for Episiotomy:       Perineal Lacerations:   Repaired:      Periurethral Laceration:   Repaired:     Labial Laceration:   Repaired:     Sulcus Laceration:   Repaired:     Vaginal Laceration:   Repaired:     Cervical Laceration:   Repaired:     Repair suture:       Repair # of packets:       Last Value - EBL - Nursing (mL):       Sum - EBL - Nursing (mL): 0     Last Value - EBL - Anesthesia (mL):        Calculated QBL (mL):         Vaginal Sweep Performed:       Surgicount Correct:         Other providers:            Details (if applicable):  Trial of Labor      Categorization:      Priority:     Indications for :     Incision Type:       Additional  information:  Forceps:    Vacuum:    Breech:    Observed anomalies    Other (Comments):

## 2022-10-04 NOTE — SUBJECTIVE & OBJECTIVE
Interval History:  Jerri is a 22 y.o.  at 37w2d. She is doing well. She is comfortable s/p stadol and phenergan this morning.  Pt declines epidural    Objective:     Vital Signs (Most Recent):  Temp: 99.9 °F (37.7 °C) (10/04/22 0615)  Pulse: 90 (10/04/22 0615)  Resp: 18 (10/04/22 0544)  BP: 118/62 (10/04/22 0615)  SpO2: 99 % (10/03/22 1412) Vital Signs (24h Range):  Temp:  [98.3 °F (36.8 °C)-99.9 °F (37.7 °C)] 99.9 °F (37.7 °C)  Pulse:  [69-96] 90  Resp:  [16-18] 18  SpO2:  [99 %] 99 %  BP: (103-153)/(57-80) 118/62     Weight: 61.7 kg (136 lb)  Body mass index is 24.09 kg/m².    FHT: Cat 1 (reassuring)  TOCO:  Q irregular minutes    Cervical Exam:  Dilation:  2  Effacement:  60  Station: -1  Presentation: Vertex     Significant Labs:  Lab Results   Component Value Date    GROUPTRH O POS 10/03/2022    HEPBSAG Negative 2022    STREPBCULT negative 2022       I have personallly reviewed all pertinent lab results from the last 24 hours.    Physical Exam

## 2022-10-05 LAB
BASOPHILS # BLD AUTO: 0.04 K/UL (ref 0–0.2)
BASOPHILS NFR BLD: 0.3 % (ref 0–1.9)
DIFFERENTIAL METHOD: ABNORMAL
EOSINOPHIL # BLD AUTO: 0.1 K/UL (ref 0–0.5)
EOSINOPHIL NFR BLD: 0.6 % (ref 0–8)
ERYTHROCYTE [DISTWIDTH] IN BLOOD BY AUTOMATED COUNT: 13.8 % (ref 11.5–14.5)
HCT VFR BLD AUTO: 31 % (ref 37–48.5)
HGB BLD-MCNC: 10 G/DL (ref 12–16)
IMM GRANULOCYTES # BLD AUTO: 0.13 K/UL (ref 0–0.04)
IMM GRANULOCYTES NFR BLD AUTO: 0.9 % (ref 0–0.5)
LYMPHOCYTES # BLD AUTO: 2 K/UL (ref 1–4.8)
LYMPHOCYTES NFR BLD: 14.5 % (ref 18–48)
MCH RBC QN AUTO: 26.9 PG (ref 27–31)
MCHC RBC AUTO-ENTMCNC: 32.3 G/DL (ref 32–36)
MCV RBC AUTO: 83 FL (ref 82–98)
MONOCYTES # BLD AUTO: 1.3 K/UL (ref 0.3–1)
MONOCYTES NFR BLD: 9.1 % (ref 4–15)
NEUTROPHILS # BLD AUTO: 10.4 K/UL (ref 1.8–7.7)
NEUTROPHILS NFR BLD: 74.6 % (ref 38–73)
NRBC BLD-RTO: 0 /100 WBC
PLATELET # BLD AUTO: 184 K/UL (ref 150–450)
PMV BLD AUTO: 11.4 FL (ref 9.2–12.9)
RBC # BLD AUTO: 3.72 M/UL (ref 4–5.4)
WBC # BLD AUTO: 13.97 K/UL (ref 3.9–12.7)

## 2022-10-05 PROCEDURE — 36415 COLL VENOUS BLD VENIPUNCTURE: CPT | Performed by: OBSTETRICS & GYNECOLOGY

## 2022-10-05 PROCEDURE — 85025 COMPLETE CBC W/AUTO DIFF WBC: CPT | Performed by: OBSTETRICS & GYNECOLOGY

## 2022-10-05 PROCEDURE — 12000002 HC ACUTE/MED SURGE SEMI-PRIVATE ROOM

## 2022-10-05 PROCEDURE — 25000003 PHARM REV CODE 250: Performed by: OBSTETRICS & GYNECOLOGY

## 2022-10-05 RX ORDER — DIPHENHYDRAMINE HCL 25 MG
25 CAPSULE ORAL EVERY 4 HOURS PRN
Status: DISCONTINUED | OUTPATIENT
Start: 2022-10-05 | End: 2022-10-06 | Stop reason: HOSPADM

## 2022-10-05 RX ORDER — HYDROCORTISONE 25 MG/G
CREAM TOPICAL 3 TIMES DAILY PRN
Status: DISCONTINUED | OUTPATIENT
Start: 2022-10-05 | End: 2022-10-06 | Stop reason: HOSPADM

## 2022-10-05 RX ORDER — SIMETHICONE 80 MG
1 TABLET,CHEWABLE ORAL EVERY 6 HOURS PRN
Status: DISCONTINUED | OUTPATIENT
Start: 2022-10-05 | End: 2022-10-06 | Stop reason: HOSPADM

## 2022-10-05 RX ORDER — OXYCODONE AND ACETAMINOPHEN 5; 325 MG/1; MG/1
1 TABLET ORAL EVERY 4 HOURS PRN
Status: DISCONTINUED | OUTPATIENT
Start: 2022-10-05 | End: 2022-10-06 | Stop reason: HOSPADM

## 2022-10-05 RX ORDER — PRENATAL WITH FERROUS FUM AND FOLIC ACID 3080; 920; 120; 400; 22; 1.84; 3; 20; 10; 1; 12; 200; 27; 25; 2 [IU]/1; [IU]/1; MG/1; [IU]/1; MG/1; MG/1; MG/1; MG/1; MG/1; MG/1; UG/1; MG/1; MG/1; MG/1; MG/1
1 TABLET ORAL DAILY
Status: DISCONTINUED | OUTPATIENT
Start: 2022-10-05 | End: 2022-10-06 | Stop reason: HOSPADM

## 2022-10-05 RX ORDER — OXYCODONE AND ACETAMINOPHEN 10; 325 MG/1; MG/1
1 TABLET ORAL EVERY 4 HOURS PRN
Status: DISCONTINUED | OUTPATIENT
Start: 2022-10-05 | End: 2022-10-06 | Stop reason: HOSPADM

## 2022-10-05 RX ORDER — OXYTOCIN-SODIUM CHLORIDE 0.9% IV SOLN 30 UNIT/500ML 30-0.9/5 UT/ML-%
30 SOLUTION INTRAVENOUS ONCE
Status: DISCONTINUED | OUTPATIENT
Start: 2022-10-05 | End: 2022-10-05

## 2022-10-05 RX ORDER — IBUPROFEN 800 MG/1
800 TABLET ORAL EVERY 6 HOURS PRN
Qty: 40 TABLET | Refills: 1 | Status: SHIPPED | OUTPATIENT
Start: 2022-10-05

## 2022-10-05 RX ORDER — DOCUSATE SODIUM 100 MG/1
200 CAPSULE, LIQUID FILLED ORAL 2 TIMES DAILY PRN
Status: DISCONTINUED | OUTPATIENT
Start: 2022-10-05 | End: 2022-10-06 | Stop reason: HOSPADM

## 2022-10-05 RX ORDER — OXYCODONE AND ACETAMINOPHEN 5; 325 MG/1; MG/1
1 TABLET ORAL EVERY 4 HOURS PRN
Qty: 12 TABLET | Refills: 0 | Status: SHIPPED | OUTPATIENT
Start: 2022-10-05

## 2022-10-05 RX ORDER — METHYLERGONOVINE MALEATE 0.2 MG/ML
200 INJECTION INTRAVENOUS
Status: DISCONTINUED | OUTPATIENT
Start: 2022-10-05 | End: 2022-10-06 | Stop reason: HOSPADM

## 2022-10-05 RX ORDER — CARBOPROST TROMETHAMINE 250 UG/ML
250 INJECTION, SOLUTION INTRAMUSCULAR
Status: DISCONTINUED | OUTPATIENT
Start: 2022-10-05 | End: 2022-10-06 | Stop reason: HOSPADM

## 2022-10-05 RX ORDER — MISOPROSTOL 200 UG/1
400 TABLET ORAL
Status: DISCONTINUED | OUTPATIENT
Start: 2022-10-05 | End: 2022-10-06 | Stop reason: HOSPADM

## 2022-10-05 RX ADMIN — PRENATAL VITAMINS-IRON FUMARATE 27 MG IRON-FOLIC ACID 0.8 MG TABLET 1 TABLET: at 09:10

## 2022-10-05 RX ADMIN — IBUPROFEN 600 MG: 200 TABLET, FILM COATED ORAL at 06:10

## 2022-10-05 RX ADMIN — OXYCODONE AND ACETAMINOPHEN 1 TABLET: 325; 5 TABLET ORAL at 06:10

## 2022-10-05 NOTE — PLAN OF CARE
10/05/22 1033   Final Note   Assessment Type Discharge Planning Assessment   Anticipated Discharge Disposition Home   What phone number can be called within the next 1-3 days to see how you are doing after discharge? [phone]   Post-Acute Status   Discharge Delays None known at this time       Discharge orders and chart reviewed with no further post-acute discharge needs identified at this time.  At this time, patient is cleared for discharge from Case Management standpoint.

## 2022-10-05 NOTE — LACTATION NOTE
[employer] pump, tubing, collections containers and labels brought to bedside.  Discussed proper pump setting of initiation phase.  Instructed on proper usage of pump and to adjust suction according to maximum comfort level.  Verified appropriate flange fit.  Educated on the frequency and duration of pumping in order to promote and maintain a full milk supply.  Hands on pumping technique reviewed.  Encouraged hand expression after pumping.  Instructed on cleaning of breast pump parts.  Written instructions also given.  Pt verbalized understanding and appropriate recall for proper milk handling, collection, labeling, storage and transportation.

## 2022-10-05 NOTE — LACTATION NOTE
10/05/22 1045   Maternal Assessment   Breast Density Bilateral:;soft   Areola Bilateral:;elastic   Nipples Bilateral:;everted   Maternal Infant Feeding   Maternal Emotional State assist needed   Infant Positioning cradle   Signs of Milk Transfer audible swallow;infant jaw motion present   Pain with Feeding no   Comfort Measures Before/During Feeding infant position adjusted;latch adjusted;maternal position adjusted   Latch Assistance yes     Assisted to breastfeed at bedside in NICU. Assisted to latch baby to left breast in cradle position. Baby latched deeply after several attempts, nursing well with audible swallows. Will review basic breastfeeding and pumping instructions with Scottish interpretor when patient returns to room.

## 2022-10-05 NOTE — PROGRESS NOTES
OB Screen completed.      10/05/22 1008   OB SCREEN   Assessment Type Discharge Planning Assessment   Source of Information health record   Received Prenatal Care Yes   Is this a teen pregnancy No   Is the baby in NICU No   Indication for adoption/[facility] Haven No   Indication for DME/post-acute needs No   HIV (+) No   Any congenital  disorders No   Fetal demise/ death No

## 2022-10-05 NOTE — DISCHARGE SUMMARY
[facility]  Obstetrics  Discharge Summary      Patient Name: Jerri Ramirez  MRN: [id number]  Admission Date: 10/3/2022  Hospital Length of Stay: 2 days  Discharge Date and Time:  10/05/2022 8:47 AM  Attending Physician: Rose Vega MD   Discharging Provider: Irene Roque MD   Primary Care Provider: Adeline Vincent MD    HPI: No notes on file        * No surgery found *     Hospital Course:   IOL for IUGR,  without complication  PPD#1 baby in NICU with apneic episodes.  Pt though requests d/c home, will hold if needs.  VSSAF  PE stable  Rh pos  hg=10    Final Active Diagnoses:    Diagnosis Date Noted POA    PRINCIPAL PROBLEM:  IUGR,  [O36.5990] 10/03/2022 Unknown      Problems Resolved During this Admission:        Significant Diagnostic Studies: Labs: All labs within the past 24 hours have been reviewed  Lab Results   Component Value Date    GROUPTRH O POS 10/03/2022         Feeding Method: breast    Immunizations     Date Immunization Status Dose Route/Site Given by    10/05/22 0447 MMR Incomplete 0.5 mL Subcutaneous/     10/05/22 0447 Tdap Incomplete 0.5 mL Intramuscular/           Delivery:    Episiotomy: None   Lacerations: None   Repair suture:     Repair # of packets: 0   Blood loss (ml):       Birth information:  YOB: 2023   Time of birth: 12:10 PM   Sex: female   Delivery type: Vaginal, Spontaneous   Gestational Age: 37w2d    Delivery Clinician:      Other providers:       Additional  information:  Forceps:    Vacuum:    Breech:    Observed anomalies      Living?:           APGARS  One minute Five minutes Ten minutes   Skin color:         Heart rate:         Grimace:         Muscle tone:         Breathing:         Totals: 5  8  9      Placenta: Delivered:       appearance    Pending Diagnostic Studies:     Procedure Component Value Units Date/Time    Specimen to Pathology - Surgery [[id number]] Collected: 10/04/22 1224    Order Status: Sent Lab  Status: No result     Specimen: Tissue           Discharged Condition: good    Disposition: Home or Self Care    Follow Up:    Patient Instructions:      Diet Adult Regular     Medications:  Current Discharge Medication List      START taking these medications    Details   ibuprofen (ADVIL,MOTRIN) 800 MG tablet Take 1 tablet (800 mg total) by mouth every 6 (six) hours as needed (cramping).  Qty: 40 tablet, Refills: 1      oxyCODONE-acetaminophen (PERCOCET) 5-325 mg per tablet Take 1 tablet by mouth every 4 (four) hours as needed.  Qty: 12 tablet, Refills: 0    Comments: Quantity prescribed more than 7 day supply? No             Irene Roque MD  Obstetrics  [facility]

## 2022-10-06 VITALS
RESPIRATION RATE: 18 BRPM | BODY MASS INDEX: 24.1 KG/M2 | OXYGEN SATURATION: 98 % | SYSTOLIC BLOOD PRESSURE: 111 MMHG | HEIGHT: 63 IN | HEART RATE: 72 BPM | WEIGHT: 136 LBS | TEMPERATURE: 98 F | DIASTOLIC BLOOD PRESSURE: 69 MMHG

## 2022-10-06 LAB
LABCORP MISC TEST CODE: NORMAL
LABCORP MISC TEST NAME: NORMAL
LABCORP MISCELLANEOUS TEST: NORMAL

## 2022-10-06 PROCEDURE — 25000003 PHARM REV CODE 250: Performed by: OBSTETRICS & GYNECOLOGY

## 2022-10-06 RX ADMIN — PRENATAL VITAMINS-IRON FUMARATE 27 MG IRON-FOLIC ACID 0.8 MG TABLET 1 TABLET: at 08:10

## 2022-10-06 RX ADMIN — IBUPROFEN 600 MG: 200 TABLET, FILM COATED ORAL at 12:10

## 2022-10-06 RX ADMIN — IBUPROFEN 600 MG: 200 TABLET, FILM COATED ORAL at 10:10

## 2022-10-06 NOTE — DISCHARGE INSTRUCTIONS
"SIGA CON CONTRERAS MÉDICO EN 4-6 SEMANAS O ANTES PARA CUALQUIER PROBLEMA.    [place] pélvico geovany 6 semanas (sin sexo, tampones, douching, nada en la vagina)   Usted puede experimentar sangrado vaginal dentro y fuera de hasta 6 semanas, poco a poco se volverá más heather y el color cambiará de zendejas brillante a tessie secreción marrón hacia el final.     Actividad:   NO hay actividades extenuantes ni ejercicio. No recoja/levante nada de más de 15 libras. No shasta tareas domésticas pesadas ni limpiando.   NO conducir geovany 3 días. Puede hacer viajes cortos en coche, roque no conducir.   Usted puede ducharse y/o bañarse en tessie bañera, ambas son aceptables. Use un jabón suave, sin perfumes pesados ni fragancias para evitar la irritación.   Si se desarrolla estreñimiento: Puede toan Colace (ablandador de heces), Leche de Magnesia, Dulcolax o Miralax. Todos estos medicamentos se venden sin receta.     Cuidado de la episiotomía:   Agentes locales edinson Tucks pads & Dermoplast spray. También puede usar un baño Sitz: sentarse en tessie elian de agua tibia geovany 15 minutos 2-3 veces al día ayudará a aliviar el malestar.     Alivio del dolor:   Puede toan Motrin para el dolor leve y los calambres uterinos.     Cambios emocionales:   Usted puede experimentar "baby blues" después del parto. Usted puede sentirse defraudado, ansioso y llorar fácilmente. [place] es normal. Estos sentimientos pueden comenzar 2-3 días después del parto y por lo general desaparecen en aproximadamente tessie o dos semanas. La tristeza prolongada puede indicar depresión posparto.     Llame a contreras médico para cualquiera de los siguientes:   Dificultad para respirar, problemas con cualquiera de shelly medicamentos, incapacidad para comer.   Secreción vaginal con olor fétido.   Temperatura por encima de 100.4.   Sangrado vaginal intenso. Todas las mujeres sangran diferentes después del parto y cada parto es diferente. El sangrado intenso consiste en saturar tessie almohadilla " chuy en un período de tiempo de 1 hora. Los coágulos que pasan son normales, si pasas un coágulo de elfego mayor que el tamaño de tessie pelota de golf, llama al consultorio de  médico.   Si experimenta dolor en las piernas/terneros, si tessie pierna aumenta de tamaño y se hincha o se calienta al tacto o se decolora.   Llorar o períodos de tristeza más allá de 2 semanas.     Si está amamantando:   Lávese los senos con jabón suave y agua tibia.   Deberías usar un sostén de apoyo.   Debe continuar tomando tessie vitamina prenatal geovany 6 semanas o hasta que se interrumpa la lactancia materna.   Si los pezones están doloridos, aplica unas gotas de leche materna después de la lactancia y vinicio que se seque al aire o puedes usar crema de Lanolin.   Si los senos están engordados, aplique calor y exprese la leche.   El consultor de lactancia [facility] está disponible al [phone] para shelly necesidades de lactancia materna.    Si no está amamantando:   Use un sostén apretado y no estimule shelly senos. Evite manipular shelly senos y no exprese leche. Usted puede aplicar compresas de hielo o hojas de repollo para aliviar las molestias del engorgement. Si los senos se calientan al tacto, se enredan o se desarrollan bultos, llame al médico.       Instrucciones de descarga de lactancia materna       Servicios de apoyo a la lactancia materna de [facility] [phone]  ? Recomendación AAP de la ctancia materna exclusiva geovany los primeros 6 meses de carissa y lactancia materna continua con la introducción de alimentos suplementarios más allá del primer año de carissa.  Instruido sobre la recomendación de retrasar todo el uso del biberón y chupete hasta después de las 4 semanas de edad y la lactancia materna está maggy establecido.  Discutió los beneficios de la lactancia materna exclusiva tanto para la madre edinson para el bebé.  Discutió los riesgos de la suplementación / uso de chupete en la exclusividad de la lactancia materna en los  "primeros 6 meses. Alimente al bebé lo antes posible de hambre o comodidad   o Pastor a la boca, chupando movimientos   o Enraizar o buscar algo para chupar   o No esperes a llorar - no es tessie señal tardía de hambre; es un signo de angustia    ? Las alimentaciones pueden ser de 8-12 veces por 24 horas y no seguirán un horario  ? Alterna el pecho con el que comienzas la alimentación, o comienza con el pecho que se siente más completo  ? Cambia los pechos cuando el bebé se siddharth del pecho o se queda dormido  ? Sigue ofreciendo pechos hasta que el bebé se abisai lleno, ya no dé signos de hambre, y se queda dormido cuando se coloca boca a contreras espalda en la cuna  ? Si el bebé tiene sueño y no se despierta para alimentarse, ponga al bebé piel a piel vestido en un pañal contra el pecho desnudo de la madre  ? Duerme cerca de tu bebé  ? El bebé debe colocarse y aferrarse correctamente a la mama   o "Pecho a pecho, barbilla en el pecho"   o Los labios del bebé están "volteados" hacia afuera   o La boca del bebé se estira abierta edisnon un grito   o La chupación del bebé debe tener ganas de tirando a la madre  ? El bebé debe beber en el pecho:  o Usted debe escuchar tragar o tragar a lo tashi de la alimentación  o Deberías bryce leche en los labios del bebé cuando sale del pecho  o Helen senos deben ser más suaves cuando el bebé haya terminado de alimentarse  o El bebé debe verse relajado al final de la alimentación  o Después del 4o día y contreras leche está en:   o La dick del bebé debe volverse de color amarillo brillante y ser suelto, acuoso y sórdido   o El bebé debe tener al menos 3-4 poops del tamaño de la michaels de la mano por día   o El bebé debe tener al menos 6-8 pañales húmedos por día   o La orina debe ser de color amarillo heather  Usted debe beber cuando tenga sed y comer tessie dieta saludable cuando    hambriento.     [place] siestas para obtener el franki que necesita.   [place] medicamentos y/o kely alcohol solo con el permiso de contreras " obstetra    o el pediatra del bebé.  También puede llamar al [facility],   ([phone]), de lunes a viernes, 8am-5pm Hora central, para obtener el caty   información actualizada basada en evidencia sobre el uso de medicamentos geovany   embarazo y lactancia.      El bebé debe ser examinado por un pediatra a los 3-5 días de edad; a menos que lo ordene antes el pediatra.  Shanta vez que contreras leche entra, el bebé debe volver a contreras peso al nacer a más tardar 10-14 días de edad.    Si tiene problemas con la lactancia materna o tiene alguna pregunta con respecto a la lactancia materna, llame a los servicios de apoyo a la lactancia materna de [facility] [phone]     .bf

## 2022-10-06 NOTE — PROGRESS NOTES
Patient is without complaints today her bleeding is minimal.  She was scheduled to go home yesterday, but the baby could not go so she stayed overnight.  The baby still cannot go, so the patient will be officially discharged now and room and with the baby tonight.    VSS, AF  Gen - NAD  Uterus - firm below umbilicus, non tender  Ext - no edema, no calf tenderness     A/p - S/p  PPD#2 doing well  D/c now  Room in tonight

## 2025-01-17 LAB — RPR: NON REACTIVE

## 2025-02-25 LAB
PRENATAL STREP B CULTURE: NEGATIVE
PRENATAL STREP B CULTURE: NEGATIVE

## 2025-03-23 ENCOUNTER — HOSPITAL ENCOUNTER (INPATIENT)
Facility: HOSPITAL | Age: 25
LOS: 2 days | Discharge: HOME | End: 2025-03-25
Attending: OBSTETRICS & GYNECOLOGY | Admitting: GENERAL PRACTICE
Payer: MEDICAID

## 2025-03-23 DIAGNOSIS — Z34.90 PREGNANCY: ICD-10-CM

## 2025-03-23 DIAGNOSIS — O47.9 UTERINE CONTRACTIONS: ICD-10-CM

## 2025-03-23 DIAGNOSIS — Z37.9 NORMAL LABOR: ICD-10-CM

## 2025-03-23 LAB
ABSOLUTE EOSINOPHIL (SMH): 0.07 K/UL
ABSOLUTE MONOCYTE (SMH): 0.85 K/UL (ref 0.3–1)
ABSOLUTE NEUTROPHIL COUNT (SMH): 6.8 K/UL (ref 1.8–7.7)
AMPHET UR QL SCN: NEGATIVE
BACTERIA #/AREA URNS AUTO: NORMAL /HPF
BARBITURATE SCN PRESENT UR: NEGATIVE
BASOPHILS # BLD AUTO: 0.03 K/UL
BASOPHILS NFR BLD AUTO: 0.3 %
BENZODIAZ UR QL SCN: NEGATIVE
BILIRUB UR QL STRIP.AUTO: NEGATIVE
BUPRENORPHINE UR QL SCN: NEGATIVE
CANNABINOIDS UR QL SCN: NEGATIVE
CLARITY UR: CLEAR
COCAINE UR QL SCN: NEGATIVE
COLOR UR AUTO: COLORLESS
CREAT UR-MCNC: 52 MG/DL (ref 15–325)
CTP QC/QA: YES
CTP QC/QA: YES
ERYTHROCYTE [DISTWIDTH] IN BLOOD BY AUTOMATED COUNT: 13.2 % (ref 11.5–14.5)
FENTANYL UR QL SCN: NEGATIVE
GLUCOSE UR QL STRIP: NEGATIVE
GROUP & RH: NORMAL
HCT VFR BLD AUTO: 36.8 % (ref 37–48.5)
HGB BLD-MCNC: 12.1 GM/DL (ref 12–16)
HGB UR QL STRIP: NEGATIVE
IMM GRANULOCYTES # BLD AUTO: 0.12 K/UL (ref 0–0.04)
IMM GRANULOCYTES NFR BLD AUTO: 1.2 % (ref 0–0.5)
INDIRECT COOMBS: NORMAL
KETONES UR QL STRIP: NEGATIVE
LEUKOCYTE ESTERASE UR QL STRIP: ABNORMAL
LYMPHOCYTES # BLD AUTO: 2.21 K/UL (ref 1–4.8)
MCH RBC QN AUTO: 29.7 PG (ref 27–31)
MCHC RBC AUTO-ENTMCNC: 32.9 G/DL (ref 32–36)
MCV RBC AUTO: 90 FL (ref 82–98)
MICROSCOPIC COMMENT: NORMAL
NITRITE UR QL STRIP: NEGATIVE
NUCLEATED RBC (/100WBC) (SMH): 0 /100 WBC
OPIATES UR QL SCN: NEGATIVE
PCP UR QL: NEGATIVE
PH UR STRIP: 7 [PH]
PLATELET # BLD AUTO: 163 K/UL (ref 150–450)
PMV BLD AUTO: 12.4 FL (ref 9.2–12.9)
PROT UR QL STRIP: NEGATIVE
RBC # BLD AUTO: 4.08 M/UL (ref 4–5.4)
RBC #/AREA URNS AUTO: 1 /HPF
RELATIVE EOSINOPHIL (SMH): 0.7 % (ref 0–8)
RELATIVE LYMPHOCYTE (SMH): 21.9 % (ref 18–48)
RELATIVE MONOCYTE (SMH): 8.4 % (ref 4–15)
RELATIVE NEUTROPHIL (SMH): 67.5 % (ref 38–73)
RUPTURE OF MEMBRANE: POSITIVE
RUPTURE OF MEMBRANE: POSITIVE
SP GR UR STRIP: 1.01
SQUAMOUS #/AREA URNS AUTO: 4 /HPF
UROBILINOGEN UR STRIP-ACNC: NEGATIVE EU/DL
WBC # BLD AUTO: 10.07 K/UL (ref 3.9–12.7)
WBC #/AREA URNS AUTO: 2 /HPF

## 2025-03-23 PROCEDURE — 86901 BLOOD TYPING SEROLOGIC RH(D): CPT | Performed by: GENERAL PRACTICE

## 2025-03-23 PROCEDURE — 36415 COLL VENOUS BLD VENIPUNCTURE: CPT | Performed by: GENERAL PRACTICE

## 2025-03-23 PROCEDURE — 80307 DRUG TEST PRSMV CHEM ANLYZR: CPT | Performed by: GENERAL PRACTICE

## 2025-03-23 PROCEDURE — 12000002 HC ACUTE/MED SURGE SEMI-PRIVATE ROOM

## 2025-03-23 PROCEDURE — 72100002 HC LABOR CARE, 1ST 8 HOURS

## 2025-03-23 PROCEDURE — 63600175 PHARM REV CODE 636 W HCPCS: Performed by: GENERAL PRACTICE

## 2025-03-23 PROCEDURE — 72200005 HC VAGINAL DELIVERY LEVEL II

## 2025-03-23 PROCEDURE — 85025 COMPLETE CBC W/AUTO DIFF WBC: CPT | Performed by: GENERAL PRACTICE

## 2025-03-23 PROCEDURE — 86593 SYPHILIS TEST NON-TREP QUANT: CPT | Performed by: GENERAL PRACTICE

## 2025-03-23 PROCEDURE — 80354 DRUG SCREENING FENTANYL: CPT | Performed by: GENERAL PRACTICE

## 2025-03-23 PROCEDURE — 81001 URINALYSIS AUTO W/SCOPE: CPT | Performed by: GENERAL PRACTICE

## 2025-03-23 RX ORDER — OXYTOCIN-SODIUM CHLORIDE 0.9% IV SOLN 30 UNIT/500ML 30-0.9/5 UT/ML-%
95 SOLUTION INTRAVENOUS ONCE AS NEEDED
Status: DISCONTINUED | OUTPATIENT
Start: 2025-03-23 | End: 2025-03-25 | Stop reason: HOSPADM

## 2025-03-23 RX ORDER — CALCIUM CARBONATE 200(500)MG
500 TABLET,CHEWABLE ORAL 3 TIMES DAILY PRN
Status: DISCONTINUED | OUTPATIENT
Start: 2025-03-23 | End: 2025-03-23

## 2025-03-23 RX ORDER — OXYTOCIN-SODIUM CHLORIDE 0.9% IV SOLN 30 UNIT/500ML 30-0.9/5 UT/ML-%
30 SOLUTION INTRAVENOUS ONCE AS NEEDED
Status: COMPLETED | OUTPATIENT
Start: 2025-03-23 | End: 2025-03-23

## 2025-03-23 RX ORDER — SODIUM CHLORIDE, SODIUM LACTATE, POTASSIUM CHLORIDE, CALCIUM CHLORIDE 600; 310; 30; 20 MG/100ML; MG/100ML; MG/100ML; MG/100ML
INJECTION, SOLUTION INTRAVENOUS CONTINUOUS
Status: DISCONTINUED | OUTPATIENT
Start: 2025-03-23 | End: 2025-03-23

## 2025-03-23 RX ORDER — HYDROCODONE BITARTRATE AND ACETAMINOPHEN 5; 325 MG/1; MG/1
1 TABLET ORAL EVERY 4 HOURS PRN
Status: DISCONTINUED | OUTPATIENT
Start: 2025-03-23 | End: 2025-03-25 | Stop reason: HOSPADM

## 2025-03-23 RX ORDER — OXYTOCIN-SODIUM CHLORIDE 0.9% IV SOLN 30 UNIT/500ML 30-0.9/5 UT/ML-%
95 SOLUTION INTRAVENOUS CONTINUOUS PRN
Status: DISCONTINUED | OUTPATIENT
Start: 2025-03-23 | End: 2025-03-25 | Stop reason: HOSPADM

## 2025-03-23 RX ORDER — METHYLERGONOVINE MALEATE 0.2 MG/ML
200 INJECTION INTRAVENOUS ONCE AS NEEDED
Status: DISCONTINUED | OUTPATIENT
Start: 2025-03-23 | End: 2025-03-23

## 2025-03-23 RX ORDER — OXYTOCIN-SODIUM CHLORIDE 0.9% IV SOLN 30 UNIT/500ML 30-0.9/5 UT/ML-%
10 SOLUTION INTRAVENOUS ONCE AS NEEDED
Status: DISCONTINUED | OUTPATIENT
Start: 2025-03-23 | End: 2025-03-25 | Stop reason: HOSPADM

## 2025-03-23 RX ORDER — CARBOPROST TROMETHAMINE 250 UG/ML
250 INJECTION, SOLUTION INTRAMUSCULAR
Status: DISCONTINUED | OUTPATIENT
Start: 2025-03-23 | End: 2025-03-23

## 2025-03-23 RX ORDER — ONDANSETRON 4 MG/1
8 TABLET, ORALLY DISINTEGRATING ORAL EVERY 8 HOURS PRN
Status: DISCONTINUED | OUTPATIENT
Start: 2025-03-23 | End: 2025-03-23

## 2025-03-23 RX ORDER — MUPIROCIN 20 MG/G
OINTMENT TOPICAL
Status: DISCONTINUED | OUTPATIENT
Start: 2025-03-23 | End: 2025-03-23

## 2025-03-23 RX ORDER — DIPHENOXYLATE HYDROCHLORIDE AND ATROPINE SULFATE 2.5; .025 MG/1; MG/1
2 TABLET ORAL EVERY 6 HOURS PRN
Status: DISCONTINUED | OUTPATIENT
Start: 2025-03-23 | End: 2025-03-23

## 2025-03-23 RX ORDER — SIMETHICONE 80 MG
1 TABLET,CHEWABLE ORAL 4 TIMES DAILY PRN
Status: DISCONTINUED | OUTPATIENT
Start: 2025-03-23 | End: 2025-03-23

## 2025-03-23 RX ORDER — MISOPROSTOL 200 UG/1
800 TABLET ORAL ONCE AS NEEDED
Status: DISCONTINUED | OUTPATIENT
Start: 2025-03-23 | End: 2025-03-23

## 2025-03-23 RX ORDER — OXYTOCIN-SODIUM CHLORIDE 0.9% IV SOLN 30 UNIT/500ML 30-0.9/5 UT/ML-%
0-32 SOLUTION INTRAVENOUS CONTINUOUS
Status: DISCONTINUED | OUTPATIENT
Start: 2025-03-23 | End: 2025-03-23

## 2025-03-23 RX ORDER — TRANEXAMIC ACID 10 MG/ML
1000 INJECTION, SOLUTION INTRAVENOUS EVERY 30 MIN PRN
Status: DISCONTINUED | OUTPATIENT
Start: 2025-03-23 | End: 2025-03-23

## 2025-03-23 RX ORDER — OXYTOCIN 10 [USP'U]/ML
10 INJECTION, SOLUTION INTRAMUSCULAR; INTRAVENOUS ONCE AS NEEDED
Status: DISCONTINUED | OUTPATIENT
Start: 2025-03-23 | End: 2025-03-23

## 2025-03-23 RX ORDER — SODIUM CHLORIDE 9 MG/ML
INJECTION, SOLUTION INTRAVENOUS
Status: DISCONTINUED | OUTPATIENT
Start: 2025-03-23 | End: 2025-03-23

## 2025-03-23 RX ORDER — LIDOCAINE HYDROCHLORIDE 10 MG/ML
10 INJECTION, SOLUTION INFILTRATION; PERINEURAL ONCE AS NEEDED
Status: DISCONTINUED | OUTPATIENT
Start: 2025-03-23 | End: 2025-03-23

## 2025-03-23 RX ORDER — IBUPROFEN 400 MG/1
800 TABLET ORAL EVERY 8 HOURS PRN
Status: DISCONTINUED | OUTPATIENT
Start: 2025-03-23 | End: 2025-03-25 | Stop reason: HOSPADM

## 2025-03-23 RX ADMIN — SODIUM CHLORIDE, POTASSIUM CHLORIDE, SODIUM LACTATE AND CALCIUM CHLORIDE: 600; 310; 30; 20 INJECTION, SOLUTION INTRAVENOUS at 08:03

## 2025-03-23 RX ADMIN — OXYTOCIN-SODIUM CHLORIDE 0.9% IV SOLN 30 UNIT/500ML 30 UNITS: 30-0.9/5 SOLUTION at 10:03

## 2025-03-23 RX ADMIN — Medication 2 MILLI-UNITS/MIN: at 08:03

## 2025-03-23 NOTE — NURSING
[facility]  Department of Obstetrics and Gynecology  Labor & Delivery Triage Assessment    PATIENT NAME: Jerri Ramirez  MRN: [id number]  TODAY'S DATE: 2025    CHIEF COMPLAINT: Contractions (Every 15 minutes/)      OB History    Para Term  AB Living   2 1 1 0 0 1   SAB IAB Ectopic Multiple Live Births   0 0 0 0 1      # Outcome Date GA Lbr Johan/2nd Weight Sex Type Anes PTL Lv   2 Current            1 Term 10/04/22 37w2d  2.425 kg (5 lb 5.5 oz) F Vag-Spont None N ASHLEY      Complications: Precipitous delivery, delivered (current hospitalization)      Name: MILLA RAMIREZ,GIRL JERRI      Apgar1: 5  Apgar5: 8     History reviewed. No pertinent past medical history.  History reviewed. No pertinent surgical history.      VITAL SIGNS - ABNORMAL VITALS INCLUDE TEMP >100.4,RR <12 or >26, SUSTAINED MATERNAL PULSE <60 or >120     VITAL SIGNS (Most Recent)  Temp: 99.3 °F (37.4 °C) (25 1741)  Pulse: 82 (25 174)  Resp: 18 (25 173)  BP: 134/88 (25 173)  SpO2: 99 % (25 174)    VITAL SIGNS     normal  HEADACHE    no     VOMITING    no  VISUAL DISTURBANCES  no  EPIGASTRIC PAIN        no  PROTEINURIA 2+ or MORE             no   EDEMA FACE/EXTREMITIES            no    FETAL MOVEMENT     FETAL MOVEMENT: Active  FETAL HEART RATE BASELINE =  130  normal  FETAL HEART RATE VARIABILITY:  Moderate  FETAL HEART RATE ACCELERATIONS FOR GESTATIONAL AGE: present  FETAL HEART RATE DECELERATIONS: none    ABDOMINAL PAIN/CRAMPING/CONTRACTIONS     Patient is complaining of abdominal pain/cramping/contractions. Contraction pattern is Irregular, less than 5 minutes apart. Contraction strength moderate. Resting tone is relaxed. Abdominal palpation is non-tender.    RUPTURE OF MEMBRANES OR LEAKING OF AMNIOTIC FLUID     Patient reports leaking of amniotic fluid and is yellow in color and reporting amount as light. . ROM plus collected is Positive. GBS status is Negative.    VAGINAL  BLEEDING     Patient denies vaginal bleeding.    VAGINAL EXAM     DILATION:  4.5  STATION:  -2  EFFACEMENT:  70  PRESENTATION:      VAGINAL EXAM DEFERRED DUE TO:        PAIN PRESENT ON ARRIVAL     ONSET:   3/23/2025  LOCATION:  abdomen  PAIN SCALE (0-10):  5  DESCRIPTION: ctx    Interventions         PATIENT DISPOSITION     Admitted to Labor & Delivery      Dr. King notified at 1748 of the above assessment.    Aleida Zavala RN  [facility]  03/23/2025

## 2025-03-23 NOTE — NURSING
[facility]  Department of Obstetrics and Gynecology  PATIENT NAME: Jerri Ramirez  MRN: [id number]  TODAY'S DATE: 2025    CHIEF COMPLAINT: Contractions (Every 15 minutes/)      OB History    Para Term  AB Living   2 1 1 0 0 1   SAB IAB Ectopic Multiple Live Births   0 0 0 0 1      # Outcome Date GA Lbr Johan/2nd Weight Sex Type Anes PTL Lv   2 Current            1 Term 10/04/22 37w2d  2.425 kg (5 lb 5.5 oz) F Vag-Spont None N ASHLEY      Complications: Precipitous delivery, delivered (current hospitalization)      Name: MILLA RAMIREZ,LISSETH ALEXIS      Apgar1: 5  Apgar5: 8     History reviewed. No pertinent past medical history.  History reviewed. No pertinent surgical history.  Social History[1]    Prenatal Labs  Lab Results   Component Value Date    GROUPTRH O POS 10/03/2022    HGB 12.1 2025    HCT 36.8 (L) 2025     2025    RUBELLAIMMUN immune 2022    HEPBSAG Negative 2022    BRI88GMWN negative 2022    RPR Non Reactive 2025    STREPBCULT negative 2025    STREPBCULT negative 2025       VITAL SIGNS - ABNORMAL VITALS INCLUDE TEMP >100.4,RR <12 or >26, SUSTAINED MATERNAL PULSE <60 or >120     VITAL SIGNS (Most Recent)  Temp: 99.3 °F (37.4 °C) (25 1741)  Pulse: 86 (25 1826)  Resp: 18 (25 1736)  BP: 134/88 (25 1736)  SpO2: 97 % (25 1826)    OUTPATIENT MEDICATIONS  Current Outpatient Medications   Medication Instructions    ibuprofen (ADVIL,MOTRIN) 800 mg, Oral, Every 6 hours PRN    oxyCODONE-acetaminophen (PERCOCET) 5-325 mg per tablet 1 tablet, Oral, Every 4 hours PRN       Aleida Zavala, RN  [facility] hospital  2025           [1]   Social History  Tobacco Use    Smoking status: Never    Smokeless tobacco: Never   Substance Use Topics    Alcohol use: Not Currently    Drug use: Not Currently

## 2025-03-24 PROBLEM — O36.5990 IUGR, ANTENATAL: Status: RESOLVED | Noted: 2022-10-03 | Resolved: 2025-03-24

## 2025-03-24 PROBLEM — Z37.9 NORMAL LABOR: Status: ACTIVE | Noted: 2025-03-24

## 2025-03-24 LAB
ABSOLUTE EOSINOPHIL (SMH): 0.03 K/UL
ABSOLUTE MONOCYTE (SMH): 1.32 K/UL (ref 0.3–1)
ABSOLUTE NEUTROPHIL COUNT (SMH): 13.5 K/UL (ref 1.8–7.7)
BASOPHILS # BLD AUTO: 0.04 K/UL
BASOPHILS NFR BLD AUTO: 0.2 %
ERYTHROCYTE [DISTWIDTH] IN BLOOD BY AUTOMATED COUNT: 13.2 % (ref 11.5–14.5)
HCT VFR BLD AUTO: 36.1 % (ref 37–48.5)
HGB BLD-MCNC: 11.9 GM/DL (ref 12–16)
IMM GRANULOCYTES # BLD AUTO: 0.15 K/UL (ref 0–0.04)
IMM GRANULOCYTES NFR BLD AUTO: 0.9 % (ref 0–0.5)
LYMPHOCYTES # BLD AUTO: 2.31 K/UL (ref 1–4.8)
MCH RBC QN AUTO: 29.5 PG (ref 27–31)
MCHC RBC AUTO-ENTMCNC: 33 G/DL (ref 32–36)
MCV RBC AUTO: 90 FL (ref 82–98)
NUCLEATED RBC (/100WBC) (SMH): 0 /100 WBC
PLATELET # BLD AUTO: 165 K/UL (ref 150–450)
PMV BLD AUTO: 12.8 FL (ref 9.2–12.9)
RBC # BLD AUTO: 4.03 M/UL (ref 4–5.4)
RELATIVE EOSINOPHIL (SMH): 0.2 % (ref 0–8)
RELATIVE LYMPHOCYTE (SMH): 13.3 % (ref 18–48)
RELATIVE MONOCYTE (SMH): 7.6 % (ref 4–15)
RELATIVE NEUTROPHIL (SMH): 77.8 % (ref 38–73)
T PALLIDUM IGG+IGM SER QL: NEGATIVE
WBC # BLD AUTO: 17.33 K/UL (ref 3.9–12.7)

## 2025-03-24 PROCEDURE — 12000002 HC ACUTE/MED SURGE SEMI-PRIVATE ROOM

## 2025-03-24 PROCEDURE — 85025 COMPLETE CBC W/AUTO DIFF WBC: CPT | Performed by: GENERAL PRACTICE

## 2025-03-24 PROCEDURE — 36415 COLL VENOUS BLD VENIPUNCTURE: CPT | Performed by: GENERAL PRACTICE

## 2025-03-24 PROCEDURE — 86762 RUBELLA ANTIBODY: CPT | Performed by: GENERAL PRACTICE

## 2025-03-24 PROCEDURE — 25000003 PHARM REV CODE 250: Performed by: GENERAL PRACTICE

## 2025-03-24 RX ORDER — AMOXICILLIN 250 MG
1 CAPSULE ORAL DAILY PRN
Status: DISCONTINUED | OUTPATIENT
Start: 2025-03-24 | End: 2025-03-25 | Stop reason: HOSPADM

## 2025-03-24 RX ORDER — OXYTOCIN/0.9 % SODIUM CHLORIDE 15/250 ML
95 PLASTIC BAG, INJECTION (ML) INTRAVENOUS CONTINUOUS PRN
Status: DISCONTINUED | OUTPATIENT
Start: 2025-03-24 | End: 2025-03-25 | Stop reason: HOSPADM

## 2025-03-24 RX ORDER — SODIUM CHLORIDE 0.9 % (FLUSH) 0.9 %
10 SYRINGE (ML) INJECTION
Status: DISCONTINUED | OUTPATIENT
Start: 2025-03-24 | End: 2025-03-25 | Stop reason: HOSPADM

## 2025-03-24 RX ORDER — OXYTOCIN/0.9 % SODIUM CHLORIDE 15/250 ML
95 PLASTIC BAG, INJECTION (ML) INTRAVENOUS ONCE AS NEEDED
Status: DISCONTINUED | OUTPATIENT
Start: 2025-03-24 | End: 2025-03-25 | Stop reason: HOSPADM

## 2025-03-24 RX ORDER — MISOPROSTOL 200 UG/1
800 TABLET ORAL ONCE AS NEEDED
Status: DISCONTINUED | OUTPATIENT
Start: 2025-03-24 | End: 2025-03-25 | Stop reason: HOSPADM

## 2025-03-24 RX ORDER — DIPHENHYDRAMINE HCL 25 MG
25 CAPSULE ORAL EVERY 4 HOURS PRN
Status: DISCONTINUED | OUTPATIENT
Start: 2025-03-24 | End: 2025-03-25 | Stop reason: HOSPADM

## 2025-03-24 RX ORDER — CARBOPROST TROMETHAMINE 250 UG/ML
250 INJECTION, SOLUTION INTRAMUSCULAR
Status: DISCONTINUED | OUTPATIENT
Start: 2025-03-24 | End: 2025-03-25 | Stop reason: HOSPADM

## 2025-03-24 RX ORDER — PRENATAL WITH FERROUS FUM AND FOLIC ACID 3080; 920; 120; 400; 22; 1.84; 3; 20; 10; 1; 12; 200; 27; 25; 2 [IU]/1; [IU]/1; MG/1; [IU]/1; MG/1; MG/1; MG/1; MG/1; MG/1; MG/1; UG/1; MG/1; MG/1; MG/1; MG/1
1 TABLET ORAL DAILY
Status: DISCONTINUED | OUTPATIENT
Start: 2025-03-24 | End: 2025-03-25 | Stop reason: HOSPADM

## 2025-03-24 RX ORDER — DIPHENOXYLATE HYDROCHLORIDE AND ATROPINE SULFATE 2.5; .025 MG/1; MG/1
2 TABLET ORAL EVERY 6 HOURS PRN
Status: DISCONTINUED | OUTPATIENT
Start: 2025-03-24 | End: 2025-03-25 | Stop reason: HOSPADM

## 2025-03-24 RX ORDER — OXYTOCIN 10 [USP'U]/ML
10 INJECTION, SOLUTION INTRAMUSCULAR; INTRAVENOUS ONCE AS NEEDED
Status: DISCONTINUED | OUTPATIENT
Start: 2025-03-24 | End: 2025-03-25 | Stop reason: HOSPADM

## 2025-03-24 RX ORDER — HYDROCORTISONE 25 MG/G
CREAM TOPICAL 3 TIMES DAILY PRN
Status: DISCONTINUED | OUTPATIENT
Start: 2025-03-24 | End: 2025-03-25 | Stop reason: HOSPADM

## 2025-03-24 RX ORDER — METHYLERGONOVINE MALEATE 0.2 MG/ML
200 INJECTION INTRAVENOUS ONCE AS NEEDED
Status: DISCONTINUED | OUTPATIENT
Start: 2025-03-24 | End: 2025-03-25 | Stop reason: HOSPADM

## 2025-03-24 RX ORDER — OXYTOCIN-SODIUM CHLORIDE 0.9% IV SOLN 30 UNIT/500ML 30-0.9/5 UT/ML-%
30 SOLUTION INTRAVENOUS ONCE AS NEEDED
Status: DISCONTINUED | OUTPATIENT
Start: 2025-03-24 | End: 2025-03-25 | Stop reason: HOSPADM

## 2025-03-24 RX ORDER — ONDANSETRON 4 MG/1
8 TABLET, ORALLY DISINTEGRATING ORAL EVERY 8 HOURS PRN
Status: DISCONTINUED | OUTPATIENT
Start: 2025-03-24 | End: 2025-03-25 | Stop reason: HOSPADM

## 2025-03-24 RX ORDER — TRANEXAMIC ACID 10 MG/ML
1000 INJECTION, SOLUTION INTRAVENOUS EVERY 30 MIN PRN
Status: DISCONTINUED | OUTPATIENT
Start: 2025-03-24 | End: 2025-03-25 | Stop reason: HOSPADM

## 2025-03-24 RX ADMIN — PRENATAL VIT W/ FE FUMARATE-FA TAB 27-0.8 MG 1 TABLET: 27-0.8 TAB at 08:03

## 2025-03-24 RX ADMIN — BENZOCAINE AND LEVOMENTHOL: 200; 5 SPRAY TOPICAL at 01:03

## 2025-03-24 RX ADMIN — IBUPROFEN 800 MG: 400 TABLET, FILM COATED ORAL at 09:03

## 2025-03-24 NOTE — SUBJECTIVE & OBJECTIVE
Interval History: s/p     She is doing well this morning. She is tolerating a regular diet without nausea or vomiting. She is voiding spontaneously. She is ambulating. She has passed flatus, and has not a BM. Vaginal bleeding is mild. She denies fever or chills. Abdominal pain is mild and controlled with oral medications. She Is breastfeeding. She desires circumcision for her male baby: no.    Objective:     Vital Signs (Most Recent):  Temp: 98.1 °F (36.7 °C) (25 1134)  Pulse: 71 (25 1134)  Resp: 17 (25 1134)  BP: 115/70 (25 1134)  SpO2: 97 % (25 1134) Vital Signs (24h Range):  Temp:  [98.1 °F (36.7 °C)-99.3 °F (37.4 °C)] 98.1 °F (36.7 °C)  Pulse:  [70-92] 71  Resp:  [16-18] 17  SpO2:  [96 %-100 %] 97 %  BP: (102-136)/(63-88) 115/70     Weight: 68 kg (150 lb)  Body mass index is 26.57 kg/m².      Intake/Output Summary (Last 24 hours) at 3/24/2025 1311  Last data filed at 3/24/2025 0129  Gross per 24 hour   Intake 426.06 ml   Output 868 ml   Net -441.94 ml         Significant Labs:  Lab Results   Component Value Date    GROUPTRH O POS 2025    HEPBSAG Negative 2022    STREPBCULT negative 2025    STREPBCULT negative 2025     Recent Labs   Lab 25  0411   HGB 11.9*   HCT 36.1*       I have personallly reviewed all pertinent lab results from the last 24 hours.    Physical Exam  Gen - NAD  Uterus - firm below umbilicus, non tender  Ext - no edema, no calf tenderness     Review of Systems

## 2025-03-24 NOTE — NURSING
Clinical Pharmacy Services: Medication History    Charmaine Machuca is a 81 y.o. female presenting to [facility] for   Chief Complaint   Patient presents with    Fall    Laceration       She  has a past medical history of Anemia, Anxiety, Cardiomyopathy (CMS/HCC), CHF (congestive heart failure) (CMS/HCC), Chronic renal failure, stage 4 (severe) (CMS/HCC), Colon polyp, Coronary artery disease, Depression, Dizzy, Gastroparesis, GAVE (gastric antral vascular ectasia), GERD (gastroesophageal reflux disease), Gout, Hemorrhoids, Hiatal hernia, History of Clostridium difficile colitis (2013), History of kidney stones, History of pancreatitis, History of skin cancer, History of transfusion, Hyperlipidemia, Hypertension, Hypothyroidism, Left bundle branch block, Mitral and aortic valve disease, Mitral valve insufficiency, Myoclonus, Osteoarthritis, Pancytopenia (CMS/HCC), Peripheral neuropathy, Presence of cardiac pacemaker, Pulmonary hypertension (CMS/HCC), SOB (shortness of breath) on exertion, Spinal stenosis, and Stroke (CMS/[facility]).    Allergies as of 02/12/2020 - Reviewed 02/12/2020   Allergen Reaction Noted    Chlorhexidine Rash and Itching 08/02/2013    Codeine Unknown - Low Severity 08/02/2013    Propoxyphene Unknown - Low Severity 08/02/2013       Medication information was obtained from: pharmacy and patient  Pharmacy and Phone Number:   [facility]/pharmacy #[id number] - [place], [place] - [address] - [phone]  - [phone]  [address]  [facility] [address]  Phone: [phone] Fax: [phone]    [facility] - [place], [place] - [address] AT Portal to [facility] Sites - [phone]  - [phone] FX  [address]  [facility] [address]  Phone: [phone] Fax: [phone]      Prior to Admission Medications       Prescriptions Last Dose Informant Patient Reported? Taking?    aspirin 81 MG tablet Past Week Self Yes Yes    Take 81 mg by mouth  Patient has remained stable this shift. All vitals within limits. See flow sheet for assessments. Bleeding is controlled. Fundus is firm and midline, pain is controlled. ID bands are on and verified. Patient has been educated this shift. All questions have been answered and denies needs at this time.      Daily.    atorvastatin (LIPITOR) 20 MG tablet  Pharmacy Yes Yes    Take 20 mg by mouth Daily.    calcitriol (ROCALTROL) 0.25 MCG capsule  Pharmacy Yes Yes    Take 0.25 mcg by mouth Every Other Day. Alternate days with calcitriol 0.5mcg    calcitriol (ROCALTROL) 0.5 MCG capsule  Pharmacy Yes Yes    Take 0.5 mcg by mouth Every Other Day. Alternate days with calcitriol 0.25mcg    calcium carbonate (OS-RAYMOND) 600 MG tablet  Self Yes Yes    Take 600 mg by mouth Daily.    carbidopa-levodopa ER (SINEMET CR)  MG per tablet  Pharmacy Yes Yes    Take 1 tablet by mouth Every Evening.    carvedilol (COREG) 12.5 MG tablet  Pharmacy Yes Yes    Take 12.5 mg by mouth Daily.    Cholecalciferol (VITAMIN D3) 5000 units capsule capsule  Self Yes Yes    Take 5,000 Units by mouth Daily.    diazepam (VALIUM) 5 MG tablet  Pharmacy Yes Yes    Take 5 mg by mouth Every Night.    DULoxetine (CYMBALTA) 30 MG capsule  Pharmacy Yes Yes    Take  by mouth 2 (Two) Times a Day.    famotidine (PEPCID) 20 MG tablet  Pharmacy No Yes    Take 1 tablet by mouth 2 (Two) Times a Day.    febuxostat (ULORIC) 40 MG tablet  Pharmacy Yes Yes    Take 40 mg by mouth Daily.    furosemide (LASIX) 40 MG tablet  Pharmacy No Yes    Take 1 tablet by mouth Daily.    Gabapentin, Once-Daily, (GRALISE) 300 MG tablet  Pharmacy Yes Yes    Take 300 mg by mouth Daily With Dinner. MAY REPEAT LATE EVENING IF NEEDED    Glucosamine-Chondroit-Vit C-Mn (GLUCOSAMINE CHONDROITIN COMPLX) capsule  Pharmacy Yes Yes    Take 1,500 mg by mouth Every Other Day.    ipratropium (ATROVENT) 0.06 % nasal spray  Pharmacy Yes Yes    1-2 sprays into the nostril(s) as directed by provider 4 (Four) Times a Day As Needed for Rhinitis.    levothyroxine (SYNTHROID, LEVOTHROID) 25 MCG tablet  Pharmacy Yes Yes    Take 25 mcg by mouth Daily.    magnesium oxide 250 MG tablet  Self Yes Yes    Take 250 mg by mouth Daily.    Multiple Vitamin (MULTI-DAY VITAMINS) tablet  Self Yes Yes    Take 1 tablet by mouth  Daily. HOLD FOR SURGERY    polyethylene glycol (MIRALAX) powder  Self Yes Yes    Take 17 g by mouth Daily As Needed.    psyllium (METAMUCIL) 58.6 % powder  Self Yes Yes    Take 1 packet by mouth Daily.    rotigotine (NEUPRO) 6 MG/24HR patch  Pharmacy Yes Yes    Place 1 patch on the skin as directed by provider Daily.    Vitamin E 400 UNITS tablet  Self Yes Yes    Take 400 Units by mouth Daily.                Medication notes:     This medication list is complete to the best of my knowledge as of 2/12/2020    Please call if questions.    Nichole Jackson [facility]  Medication History Technician  [phone]    2/12/2020 5:56 PM

## 2025-03-24 NOTE — PLAN OF CARE
[facility]  Discharge Assessment    Primary Care Provider: Adeline Vincent MD     OB Screen completed using health record.  No needs anticipated at this time, and no consult(s) noted.    OB Screen (most recent)       OB Screen - 25 0900          OB SCREEN    Assessment Type Discharge Planning Assessment     Source of Information health record     Received Prenatal Care Yes     Any indications/suspicions for None     Is this a teen pregnancy No     Is the baby in NICU No     Indication for adoption/Safe Haven No     Indication for DME/post-acute needs No     HIV (+) No     Any congenital  disorders No     Fetal demise/ death No

## 2025-03-24 NOTE — L&D DELIVERY NOTE
VAGINAL DELIVERY NOTE  2025    HOSPITAL COURSE   Jerri Ramirez is a 25 y.o.  who presented to L&D on 2025 at 40w2d with complaint of painful contractions all day today.  Thinks she might have broken her water around noon yesterday.  No VB.  Active FM.  No fevers, no abdominal pain outside of contractions. ROM-plus positive and CVX 4-5cm.  Patient admitted.  Started on pitocin a couple hours after presentation for no cervical change.  Uncomplicated, unmedicated .     PREGNANCY & DELIVERY SUMMARY   Antepartum complications:  GBS negative  Portuguese speaking  Late PNC @ 23wks  Prior IUGR    Primary OB Doctor: Dr. Rose Vega    Gestational Age @ Delivery:40w2d  G'sP's:      Delivery Type: spontaneous vaginal delivery    Date of Delivery: 3/23/2025  Time of Birth: 2237hrs     Delivered By: Dr. Ela King  Anesthesia: none  Intrapartum complications: None  Estimated Blood Loss: 100cc  Laceration / Repair: 1st degree (superficial, hemostatic, not repaired)  Placenta: spontaneous    Baby:   Liveborn male  Apgars 9/9  Weight pending    Feeding method: breast    Maternal Blood Type:   Lab Results   Component Value Date    GROUPTRH O POS 2025    INDIRECTCOOM NEG 2025      Maternal Rubella Immunity Status:     Lab Results   Component Value Date    RUBELLAIMMUN immune 2022        Pushing for: two contractions  Episiotomy: no  Position at delivery: OA  Anterior shoulder: left  Nuchal cord: yes, reduced on the perineum  Amniotic fluid: clear  Cord gases: not indicated  Delayed cord clamping x 60 sec: yes  Placenta: spontaneous, intact  Pitocin given: yes just prior to placenta delivery  Other uterotonics: not indicated  Vaginal sweep: negative for retained foreign object  Sponge / instrument counts: correct  Specimens: none  Complications:  none  Maternal Condition: stable  Infant Condition: stable    Comments: uncomplicated, unmedicated quick     I, Ela King MD,  performed this service on behalf of Dr. Rose Vega.     Patent

## 2025-03-24 NOTE — NURSING
Patient ambulated to BR without any problems and voided without difficulty 300ml for the 1st postpartum void. Pericare with periwash warm water bottle used independently and changed to clean gown. To w/c to await transfer to postpartum.

## 2025-03-24 NOTE — PROGRESS NOTES
[facility]  Obstetrics  Postpartum Progress Note    Patient Name: Jerri Ramirez  MRN: [id number]  Admission Date: 3/23/2025  Hospital Length of Stay: 1 days  Attending Physician: Rose Vega MD  Primary Care Provider: Adeline Vincent MD    Subjective:     Principal Problem:<principal problem not specified>    Hospital Course:  No notes on file    Interval History: s/p     She is doing well this morning. She is tolerating a regular diet without nausea or vomiting. She is voiding spontaneously. She is ambulating. She has passed flatus, and has not a BM. Vaginal bleeding is mild. She denies fever or chills. Abdominal pain is mild and controlled with oral medications. She Is breastfeeding. She desires circumcision for her male baby: no.    Objective:     Vital Signs (Most Recent):  Temp: 98.1 °F (36.7 °C) (25 1134)  Pulse: 71 (25 1134)  Resp: 17 (25 1134)  BP: 115/70 (25 1134)  SpO2: 97 % (25 1134) Vital Signs (24h Range):  Temp:  [98.1 °F (36.7 °C)-99.3 °F (37.4 °C)] 98.1 °F (36.7 °C)  Pulse:  [70-92] 71  Resp:  [16-18] 17  SpO2:  [96 %-100 %] 97 %  BP: (102-136)/(63-88) 115/70     Weight: 68 kg (150 lb)  Body mass index is 26.57 kg/m².      Intake/Output Summary (Last 24 hours) at 3/24/2025 1311  Last data filed at 3/24/2025 0129  Gross per 24 hour   Intake 426.06 ml   Output 868 ml   Net -441.94 ml         Significant Labs:  Lab Results   Component Value Date    GROUPTRH O POS 2025    HEPBSAG Negative 2022    STREPBCULT negative 2025    STREPBCULT negative 2025     Recent Labs   Lab 25  0411   HGB 11.9*   HCT 36.1*       I have personallly reviewed all pertinent lab results from the last 24 hours.    Physical Exam  Gen - NAD  Uterus - firm below umbilicus, non tender  Ext - no edema, no calf tenderness     Review of Systems  Assessment/Plan:     25 y.o. female  for:    Normal labor  S/p  PPD#1  Doing  well    Continue postpartum care        .    Rose Vega MD  Obstetrics  [facility]

## 2025-03-24 NOTE — PLAN OF CARE
[facility]  Discharge Assessment    Primary Care Provider: Adeline Vincent MD     OB Screen completed using health record.  No needs anticipated at this time, and no consult(s) noted.    OB Screen (most recent)       OB Screen - 25 0900          OB SCREEN    Assessment Type Discharge Planning Assessment     Source of Information health record     Received Prenatal Care Yes     Any indications/suspicions for None     Is this a teen pregnancy No     Is the baby in NICU No     Indication for adoption/Safe Haven No     Indication for DME/post-acute needs No     HIV (+) No     Any congenital  disorders No     Fetal demise/ death No

## 2025-03-24 NOTE — H&P
HISTORY AND PHYSICAL  2025    Primary OB Doctor: Dr. Rose Vega    Language: Estonian / : Danyelle / Encounter #[id number]     SUBJECTIVE   Jerri Ramirez is a 25 y.o.  who presented to L&D on 2025 at 40w2d with complaint of painful contractions all day today.  Thinks she might have broken her water around noon yesterday.  No VB.  Active FM.  No fevers, no abdominal pain outside of contractions.    Dating criteria: 23+4wk US    Antepartum complications:  GBS negative  Estonian speaking  Late PNC @ 23wks  Prior IUGR    History reviewed. No pertinent past medical history.     History reviewed. No pertinent surgical history.    MEDS = PNV    Review of patient's allergies indicates:  No Known Allergies    OB HX =  :  @ 37wks, girl, was IOL for suspected IUGR, baby was 5lb 6oz, no epidural, bed delivery with no attendant, no complications    GYN HX =  Denies STD    SOC HX =  Denies T / E/ D  FOB = , present and supportive      OBJECTIVE     Vital Signs (Most Recent):  Temp: 98.1 °F (36.7 °C) (25)  Pulse: 80 (25)  Resp: 18 (25)  BP: 112/73 (25)  SpO2: 100 % (25) Vital Signs (24h Range):  Temp:  [98.1 °F (36.7 °C)-99.3 °F (37.4 °C)] 98.1 °F (36.7 °C)  Pulse:  [77-92] 80  Resp:  [16-18] 18  SpO2:  [97 %-100 %] 100 %  BP: (112-134)/(73-88) 112/73     GEN = alert/oriented, nad  HEENT = sclera anicteric, EOM grossly normal  BREASTS = deferred, no concerns  CV = BP and HR as per vitals  PULM = normal respiratory effort  ABD = soft, gravid, nontender, nondistended, EFW =<7lbs by palpation   = Cervix: 4-5/70/-2 per RN @ 1800hrs    FHR: 130bpm baseline, moderate variability, spont accels, no decels  TOCO: q2-5 min    ANATOMY ULTRASOUND:  ANATOMY: wnl  PLACENTA: posterior    ROUTINE OB LABS:  MBT: O positive  AB SCREEN: negative  FTA-ABS: negative  RUBELLA: Immune  Hep B sAg: negative  Hep C Ab: negative  HIV: negative  HGB:  normal  DENIZ/CHLAM: negative x 2   cfDNA ([id number]): XY, neg for T13, T18, T21  1HR GDM SCREEN: no record    ADMISSION LABS:  Lab Results   Component Value Date    RUPTMEMB Positive (A) 2025      Recent Labs   Lab 25  1807   WBC 10.07   HGB 12.1   HCT 36.8*      MCV 90     UDS pending    ASSESSMENT / PLAN  25 y.o.  at 40w2d with prelabor ROM since yesterday though seems to be progressing in labor today, having painful contractions.  No evidence of infection.  Reassuring fetal and maternal status.    Informed consent previously obtained for delivery.  Antibiotics: not indicated  Patient agrees to pitocin if no cervical change @ 2hrs from last check  Encouraged to call us if feeling pressure, need for BM, or urge to push  Anesthesia consult prn.    Ela King MD

## 2025-03-24 NOTE — PLAN OF CARE
Patient progressing well. Patient is able to ambulate independently. Voiding spontaneously. Pain controlled adequately. Vital signs stable.    Problem: Adult Inpatient Plan of Care  Goal: Plan of Care Review  Outcome: Progressing     Problem: Adult Inpatient Plan of Care  Goal: Patient-Specific Goal (Individualized)  Outcome: Progressing     Problem: Adult Inpatient Plan of Care  Goal: Optimal Comfort and Wellbeing  Outcome: Progressing     Problem: Adult Inpatient Plan of Care  Goal: Readiness for Transition of Care  Outcome: Progressing     Problem:  Fall Injury Risk  Goal: Absence of Fall, Infant Drop and Related Injury  Outcome: Progressing

## 2025-03-24 NOTE — LACTATION NOTE
03/24/25 1335   Maternal Infant Feeding   Maternal Emotional State independent;relaxed   Infant Positioning cradle   Signs of Milk Transfer audible swallow;infant jaw motion present   Pain with Feeding no   Latch Assistance no     Mom independently latched infant to left breast in cradle position. Infant deeply latched with audible swallows. Mom denies pain with feeding. Breastfeeding basics reviewed; verbalized understanding with good recall. Assistance offered prn. Mom denies needing breastfeeding assistance at this time.

## 2025-03-24 NOTE — NURSING TRANSFER
Nursing Transfer Note      3/24/2025   12:34 AM    Nurse giving handoff:Chantelle Encarnacion rn  Nurse receiving handoff:Le Singh rn    Reason patient is being transferred: postpartum    Transfer To:     Transfer via wheelchair    Transfer with personal belongings and  in open crib    Transported by kitty Lockhart/ Scott Barlow rn    Transfer Vital Signs:  Blood Pressure:121/75   Heart Rate:82  O2:97  Temperature:98.2    Respirations:18    Order for Tele Monitor? No    Medicines sent: none    Any special needs or follow-up needed: lactation consult    Patient belongings transferred with patient: Yes    Chart send with patient: Yes    Notified: significant other with patient    Patient reassessed at: 2025 at 0024 (date, time)    Upon arrival to floor: patient oriented to room, call bell in reach, and bed in lowest position

## 2025-03-25 VITALS
OXYGEN SATURATION: 98 % | HEIGHT: 63 IN | SYSTOLIC BLOOD PRESSURE: 120 MMHG | TEMPERATURE: 98 F | WEIGHT: 150 LBS | HEART RATE: 86 BPM | RESPIRATION RATE: 18 BRPM | DIASTOLIC BLOOD PRESSURE: 82 MMHG | BODY MASS INDEX: 26.58 KG/M2

## 2025-03-25 PROCEDURE — 25000003 PHARM REV CODE 250: Performed by: GENERAL PRACTICE

## 2025-03-25 RX ORDER — HYDROCODONE BITARTRATE AND ACETAMINOPHEN 5; 325 MG/1; MG/1
1 TABLET ORAL EVERY 4 HOURS PRN
Qty: 10 TABLET | Refills: 0 | Status: SHIPPED | OUTPATIENT
Start: 2025-03-25

## 2025-03-25 RX ORDER — IBUPROFEN 800 MG/1
800 TABLET ORAL EVERY 8 HOURS PRN
Qty: 30 TABLET | Refills: 0 | Status: SHIPPED | OUTPATIENT
Start: 2025-03-25

## 2025-03-25 RX ORDER — PRENATAL WITH FERROUS FUM AND FOLIC ACID 3080; 920; 120; 400; 22; 1.84; 3; 20; 10; 1; 12; 200; 27; 25; 2 [IU]/1; [IU]/1; MG/1; [IU]/1; MG/1; MG/1; MG/1; MG/1; MG/1; MG/1; UG/1; MG/1; MG/1; MG/1; MG/1
1 TABLET ORAL DAILY
Qty: 30 TABLET | Refills: 11 | Status: SHIPPED | OUTPATIENT
Start: 2025-03-26 | End: 2026-03-26

## 2025-03-25 RX ADMIN — PRENATAL VIT W/ FE FUMARATE-FA TAB 27-0.8 MG 1 TABLET: 27-0.8 TAB at 08:03

## 2025-03-25 NOTE — PLAN OF CARE
03/25/25 1140   Final Note   Assessment Type Final Discharge Note   Anticipated Discharge Disposition Home   What phone number can be called within the next 1-3 days to see how you are doing after discharge? [phone]   Post-Acute Status   Discharge Delays None known at this time     Discharge orders and chart reviewed with no further post-acute discharge needs identified at this time.  At this time, patient is cleared for discharge from Case Management standpoint.

## 2025-03-25 NOTE — HOSPITAL COURSE
PPD 2 - doing well. No complaints. Fundus firm 2 below. Normal lochia. Pain well controlled. Desires discharge today.

## 2025-03-25 NOTE — PLAN OF CARE
VSS. Uterus firm w/o massage, bleeding is light. Patient is ambulating independently, voiding spontaneously.  Patient has not reported pain on any level this shift. Plan of care reviewed with patient. Patient requested formula at the beginning of shift for infant; after reviewing risk of formula feeding with use of the Tabitha (#[id number]), patient decided not to use formula. No other concerns at this time.-K.M.    Instructed on the risks of formula feeding including:  Lacks the nutrients found in colostrums to help prevent infection, mature the gut, aid in digestion and resist allergies  Contains artificial additives and preservatives which increases incidence of contamination  Increase spitting up due to slower digestion  Increased cost and requires preparation, including bottle sanitation and formula refrigeration  Increased incidence of NEC for the  baby  Increased risk of diabetes with family history, SIDS and ear infections  Skipped feedings for the breastfeeding mother increases chance of engorgement, mastitis and plugged ducts  Decreases breastfeeding babys appetite resulting in poor feeding session, decreased breast stimulation and poor milk supply  Exposes the breastfeeding baby to the possibility of allergic reactions and colic  Pt states understanding and verbalized appropriate recall.    No new concerns expressed at this time. Will continue to monitor appropriately.-TISH.

## 2025-03-25 NOTE — DISCHARGE INSTRUCTIONS
"SIGA CON CONTRERAS MÉDICO EN 4-6 SEMANAS O ANTES PARA CUALQUIER PROBLEMA.    [place] pélvico geovany 6 semanas (sin sexo, tampones, douching, nada en la vagina)   Usted puede experimentar sangrado vaginal dentro y fuera de hasta 6 semanas, poco a poco se volverá más heather y el color cambiará de zendejas brillante a tessie secreción marrón hacia el final.     Actividad:   NO hay actividades extenuantes ni ejercicio. No recoja/levante nada de más de 15 libras. No shasta tareas domésticas pesadas ni limpiando.   NO conducir geovany 3 días. Puede hacer viajes cortos en coche, roque no conducir.   Usted puede ducharse y/o bañarse en tessie bañera, ambas son aceptables. Use un jabón suave, sin perfumes pesados ni fragancias para evitar la irritación.   Si se desarrolla estreñimiento: Puede toan Colace (ablandador de heces), Leche de Magnesia, Dulcolax o Miralax. Todos estos medicamentos se venden sin receta.     Cuidado de la episiotomía:   Agentes locales edinson Tucks pads & Dermoplast spray. También puede usar un baño Sitz: sentarse en tessie elian de agua tibia geovany 15 minutos 2-3 veces al día ayudará a aliviar el malestar.     Alivio del dolor:   Puede toan Motrin para el dolor leve y los calambres uterinos.     Cambios emocionales:   Usted puede experimentar "baby blues" después del parto. Usted puede sentirse defraudado, ansioso y llorar fácilmente. [place] es normal. Estos sentimientos pueden comenzar 2-3 días después del parto y por lo general desaparecen en aproximadamente tessie o dos semanas. La tristeza prolongada puede indicar depresión posparto.     Llame a contreras médico para cualquiera de los siguientes:   Dificultad para respirar, problemas con cualquiera de shelly medicamentos, incapacidad para comer.   Secreción vaginal con olor fétido.   Temperatura por encima de 100.4.   Sangrado vaginal intenso. Todas las mujeres sangran diferentes después del parto y cada parto es diferente. El sangrado intenso consiste en saturar tessie almohadilla " chuy en un período de tiempo de 1 hora. Los coágulos que pasan son normales, si pasas un coágulo de elfego mayor que el tamaño de tessie pelota de golf, llama al consultorio de  médico.   Si experimenta dolor en las piernas/terneros, si tessie pierna aumenta de tamaño y se hincha o se calienta al tacto o se decolora.   Llorar o períodos de tristeza más allá de 2 semanas.     Si está amamantando:   Lávese los senos con jabón suave y agua tibia.   Deberías usar un sostén de apoyo.   Debe continuar tomando tessie vitamina prenatal geovany 6 semanas o hasta que se interrumpa la lactancia materna.   Si los pezones están doloridos, aplica unas gotas de leche materna después de la lactancia y vinicio que se seque al aire o puedes usar crema de Lanolin.   Si los senos están engordados, aplique calor y exprese la leche.   El consultor de lactancia [facility] está disponible al [phone] para shelly necesidades de lactancia materna.    Si no está amamantando:   Use un sostén apretado y no estimule shelly senos. Evite manipular shelly senos y no exprese leche. Usted puede aplicar compresas de hielo o hojas de repollo para aliviar las molestias del engorgement. Si los senos se calientan al tacto, se enredan o se desarrollan bultos, llame al médico.

## 2025-03-25 NOTE — DISCHARGE SUMMARY
"[facility]  Obstetrics  Discharge Summary      Patient Name: Jerri Ramirez  MRN: [id number]  Admission Date: 3/23/2025  Hospital Length of Stay: 2 days  Discharge Date and Time:  2025 9:28 AM  Attending Physician: Rose Vega MD   Discharging Provider: Elza Parish CNM   Primary Care Provider: Adeline Vincent MD    HPI: 25 year old  with spontaneous labor onset resulting in vaginal delivery of viable male infant.         * No surgery found *     Hospital Course:   PPD 2 - doing well. No complaints. Fundus firm 2 below. Normal lochia. Pain well controlled. Desires discharge today.          Final Active Diagnoses:    Diagnosis Date Noted POA    PRINCIPAL PROBLEM:  Normal labor [O80, Z37.9] 2025 Not Applicable      Problems Resolved During this Admission:    Diagnosis Date Noted Date Resolved POA    IUGR,  [O36.5990] 10/03/2022 2025 Yes        Significant Diagnostic Studies: N/A  Lab Results   Component Value Date    GROUPTRH O POS 2025         Feeding Method: both breast and bottle    Immunizations       Date Immunization Status Dose Route/Site Given by    25 0150 Tdap Incomplete 0.5 mL Intramuscular/             Delivery:    Episiotomy: None   Lacerations: 1st   Repair suture: None   Repair # of packets:     Blood loss (ml):       Birth information:  YOB: 2026   Time of birth: 10:37 PM   Sex: male   Delivery type: Vaginal, Spontaneous   Gestational Age: 40w2d     Measurements    Weight: 3485 g  Weight (lbs): 7 lb 10.9 oz  Length: 52.1 cm  Length (in): 20.5"         Delivery Clinician: Delivery Providers    Delivering clinician: Ela King MD   Provider Role    Chantelle Encarnacion RN Registered Nurse    Joey Danielson, [facility] Tech    Julián Chance RN Charge Nurse    Scott Barlow RN Registered Nurse             Additional  information:  Forceps:    Vacuum:    Breech:    Observed anomalies      Living?: "     Apgars    Living status: Living  Apgar Component Scores:  1 min.:  5 min.:  10 min.:  15 min.:  20 min.:    Skin color:  1  1       Heart rate:  2  2       Reflex irritability:  2  2       Muscle tone:  2  2       Respiratory effort:  2  2       Total:  9  9       Apgars assigned by: VIPUL FRAZIER         Placenta: Delivered:       appearance  Pending Diagnostic Studies:       Procedure Component Value Units Date/Time    EXTRA TUBES [[id number]] Collected: 25    Order Status: Sent Lab Status: In process Updated: 25    Specimen: Blood, Venous     Narrative:      The following orders were created for panel order EXTRA TUBES.  Procedure                               Abnormality         Status                     ---------                               -----------         ------                     Lavender Top Hold[[id number]]                               In process                 Gold Top Hold[[id number]]                                   In process                 Blood Bank Hold[[id number]]                                 In process                 Blood Bank Hold[[id number]]                                 In process                   Please view results for these tests on the individual orders.            Discharged Condition: good    Disposition: Home or Self Care    Follow Up:   Follow-up Information       Rose Vega MD Follow up in 6 week(s).    Specialties: Obstetrics, Obstetrics and Gynecology  Contact information:  [address]  [address]  [facility]  [facility] [address]  [phone]                           Patient Instructions:      BREAST PUMP FOR HOME USE     Order Specific Question Answer Comments   Type of pump: Electric    Weight: 68 kg (150 lb)    Length of need (1-99 months): 99      Diet Adult Regular     Lifting restrictions     No driving until:     Pelvic Rest   Order Comments: Nothing in the vagina for 6 weeks     Notify your  health care provider if you experience any of the following:  temperature >100.4     Notify your health care provider if you experience any of the following:  persistent nausea and vomiting or diarrhea     Notify your health care provider if you experience any of the following:  severe uncontrolled pain     Notify your health care provider if you experience any of the following:  difficulty breathing or increased cough     Notify your health care provider if you experience any of the following:  severe persistent headache     Notify your health care provider if you experience any of the following:  persistent dizziness, light-headedness, or visual disturbances     Notify your health care provider if you experience any of the following:  increased confusion or weakness     Activity as tolerated     Medications:  Current Discharge Medication List        START taking these medications    Details   HYDROcodone-acetaminophen (NORCO) 5-325 mg per tablet Take 1 tablet by mouth every 4 (four) hours as needed for Pain.  Qty: 10 tablet, Refills: 0    Comments: Quantity prescribed more than 7 day supply? No  Associated Diagnoses: Vaginal delivery      PNV,calcium 72/iron/folic acid (PRENATAL VITAMIN) Tab Take 1 tablet by mouth once daily.  Qty: 30 tablet, Refills: 11           CONTINUE these medications which have CHANGED    Details   ibuprofen (ADVIL,MOTRIN) 800 MG tablet Take 1 tablet (800 mg total) by mouth every 8 (eight) hours as needed for Other (Cramping).  Qty: 30 tablet, Refills: 0           STOP taking these medications       oxyCODONE-acetaminophen (PERCOCET) 5-325 mg per tablet Comments:   Reason for Stopping:               Elza Parish CNM  Obstetrics  [facility]

## 2025-03-25 NOTE — PLAN OF CARE
Patient appropriate for discharge. Vital signs stable. Pain adequately controlled. Education complete.     Problem: Adult Inpatient Plan of Care  Goal: Plan of Care Review  Outcome: Met     Problem: Adult Inpatient Plan of Care  Goal: Optimal Comfort and Wellbeing  Outcome: Met     Problem: Adult Inpatient Plan of Care  Goal: Readiness for Transition of Care  Outcome: Met     Problem: Breastfeeding  Goal: Effective Breastfeeding  Outcome: Met     Problem: Postpartum (Vaginal Delivery)  Goal: Successful Parent Role Transition  Outcome: Met